# Patient Record
Sex: MALE | Race: WHITE | NOT HISPANIC OR LATINO
[De-identification: names, ages, dates, MRNs, and addresses within clinical notes are randomized per-mention and may not be internally consistent; named-entity substitution may affect disease eponyms.]

---

## 2019-01-14 ENCOUNTER — APPOINTMENT (OUTPATIENT)
Dept: ORTHOPEDIC SURGERY | Facility: CLINIC | Age: 79
End: 2019-01-14
Payer: MEDICARE

## 2019-01-14 VITALS — HEIGHT: 69 IN | RESPIRATION RATE: 16 BRPM | BODY MASS INDEX: 23.7 KG/M2 | WEIGHT: 160 LBS

## 2019-01-14 DIAGNOSIS — Z87.438 PERSONAL HISTORY OF OTHER DISEASES OF MALE GENITAL ORGANS: ICD-10-CM

## 2019-01-14 DIAGNOSIS — E78.00 PURE HYPERCHOLESTEROLEMIA, UNSPECIFIED: ICD-10-CM

## 2019-01-14 PROBLEM — Z00.00 ENCOUNTER FOR PREVENTIVE HEALTH EXAMINATION: Status: ACTIVE | Noted: 2019-01-14

## 2019-01-14 PROCEDURE — 73070 X-RAY EXAM OF ELBOW: CPT | Mod: 50

## 2019-01-14 PROCEDURE — 99203 OFFICE O/P NEW LOW 30 MIN: CPT | Mod: 25

## 2019-01-14 PROCEDURE — 20605 DRAIN/INJ JOINT/BURSA W/O US: CPT | Mod: LT

## 2019-01-14 RX ORDER — ATORVASTATIN CALCIUM 10 MG/1
10 TABLET, FILM COATED ORAL
Refills: 0 | Status: ACTIVE | COMMUNITY

## 2019-01-14 RX ORDER — FINASTERIDE 5 MG/1
5 TABLET, FILM COATED ORAL
Refills: 0 | Status: ACTIVE | COMMUNITY

## 2019-01-14 RX ORDER — TAMSULOSIN HYDROCHLORIDE 0.4 MG/1
0.4 CAPSULE ORAL
Refills: 0 | Status: ACTIVE | COMMUNITY

## 2019-01-14 NOTE — HISTORY OF PRESENT ILLNESS
[Right] : right hand dominant [FreeTextEntry1] : Pt here for B/L medial epicondylitis with a MRI on the right done on 12/21/18.  Pt tried therapy that made it worse.  \par \par He has tried rest and Nsaids and therapy.\par \par He denies any significant history of trauma changes in activity or changes in sensation or feel some nausea and mechanical symptoms\par

## 2019-01-14 NOTE — REVIEW OF SYSTEMS
[Right] : right [Arthralgia] : arthralgia [Joint Pain] : joint pain [Joint Stiffness] : joint stiffness [Joint Swelling] : joint swelling [Negative] : Allergic/Immunologic

## 2019-01-14 NOTE — PHYSICAL EXAM
[de-identified] : Physical exam shows the patient to be alert and oriented x3, capable of ambulation. The patient is well-developed and well-nourished in no apparent respiratory distress. Majority of the skin is intact bilaterally in the upper extremities without lymphadenopathy at the elbows.\par \par There is a negative Spurling test. There is no sensitivity or Tinel's over the axilla bilaterally in the area of the brachial plexus.\par \par The elbows have a symmetric and full range of motion bilaterally without evidence of pain on forced flexion or extension. No masses are palpable bilaterally. There is no tenderness or instability of the medial or lateral collateral ligaments bilaterally. There is no joint effusion. There is no tenderness over the biceps or triceps tendons bilaterally with 5 over 5 strength. There is no tenderness over the lateral epicondyles bilaterally. There is tenderness over the right greater than left medial epicondyle with pain increased with forced wrist flexion and resisted pronation which localizes to the medial epicondyle. This is negative on the opposite asymptomatic side. There is no tenderness over the radial tunnel. There is no sensitivity over the radial , median nerves and no changes in sensation with provocative movements over these nerves. There is no instability or sensitivity over the ulnar nerve through a full range of motion and was provocative tests bilaterally.\par \par There is good capillary refill of the digits bilaterally.There is no masses or sensitivity over the median and ulnar nerves at the level of the wrist. There is a negative Tinel's and negative Phalen's sign bilaterally. The sensation is grossly intact bilaterally.

## 2019-01-14 NOTE — CONSULT LETTER
[Dear  ___] : Dear  [unfilled], [Consult Letter:] : I had the pleasure of evaluating your patient, [unfilled]. [Please see my note below.] : Please see my note below. [Consult Closing:] : Thank you very much for allowing me to participate in the care of this patient.  If you have any questions, please do not hesitate to contact me. [Sincerely,] : Sincerely, [FreeTextEntry3] : Christian Guthrie M.D., FAAOS\par Co-Director\par [facility]\par

## 2019-01-14 NOTE — ASSESSMENT
[FreeTextEntry1] : Right greater than left elbow medial epicondylitis.\par The right biceps tendinitis seen on MRI is clinically asymptomatic.\par \par My impression is that this patient has right greater than left lateral epicondylitis.   The risks, benefits, differential diagnosis and alternatives were discussed with the patient in detail and they elected to undergo a steroid injection right medial elbow epicondyle combined with a home program and therapy bilaterally. The risks discussed included (but was not limited to) pain, infection, subcutaneous atrophy, skin depigmentation, etc... Under informed consent and sterile conditions, 1 cc of 2% plain lidocaine  and 1 cc of Kenalog 10 was precisely injected into the patient's lateral elbow at the ECRB origin at the area of maximal tenderness. A sterile Band-Aid was placed.  It is my hope that this significantly alleviates the patient's symptoms.\par \par  It is hoped that this results in a complete resolution of symptoms but his symptoms.\par \par Return to the office in 4 weeks\par

## 2019-02-11 ENCOUNTER — APPOINTMENT (OUTPATIENT)
Dept: ORTHOPEDIC SURGERY | Facility: CLINIC | Age: 79
End: 2019-02-11
Payer: MEDICARE

## 2019-02-11 VITALS — WEIGHT: 160 LBS | HEIGHT: 69 IN | BODY MASS INDEX: 23.7 KG/M2 | RESPIRATION RATE: 16 BRPM

## 2019-02-11 PROCEDURE — 99214 OFFICE O/P EST MOD 30 MIN: CPT | Mod: 25

## 2019-02-11 PROCEDURE — 20605 DRAIN/INJ JOINT/BURSA W/O US: CPT | Mod: LT

## 2019-03-27 ENCOUNTER — APPOINTMENT (OUTPATIENT)
Dept: ORTHOPEDIC SURGERY | Facility: CLINIC | Age: 79
End: 2019-03-27
Payer: MEDICARE

## 2019-03-27 VITALS — RESPIRATION RATE: 16 BRPM | HEIGHT: 69 IN | BODY MASS INDEX: 23.7 KG/M2 | WEIGHT: 160 LBS

## 2019-03-27 PROCEDURE — 99214 OFFICE O/P EST MOD 30 MIN: CPT

## 2019-05-08 ENCOUNTER — APPOINTMENT (OUTPATIENT)
Dept: ORTHOPEDIC SURGERY | Facility: CLINIC | Age: 79
End: 2019-05-08
Payer: MEDICARE

## 2019-05-08 VITALS — RESPIRATION RATE: 16 BRPM | WEIGHT: 160 LBS | BODY MASS INDEX: 23.7 KG/M2 | HEIGHT: 69 IN

## 2019-05-08 DIAGNOSIS — M77.11 LATERAL EPICONDYLITIS, RIGHT ELBOW: ICD-10-CM

## 2019-05-08 DIAGNOSIS — M77.02 MEDIAL EPICONDYLITIS, LEFT ELBOW: ICD-10-CM

## 2019-05-08 DIAGNOSIS — M77.01 MEDIAL EPICONDYLITIS, RIGHT ELBOW: ICD-10-CM

## 2019-05-08 DIAGNOSIS — M77.12 LATERAL EPICONDYLITIS, LEFT ELBOW: ICD-10-CM

## 2019-05-08 PROCEDURE — 99214 OFFICE O/P EST MOD 30 MIN: CPT

## 2019-06-19 ENCOUNTER — OTHER (OUTPATIENT)
Age: 79
End: 2019-06-19

## 2019-06-19 ENCOUNTER — MOBILE ON CALL (OUTPATIENT)
Age: 79
End: 2019-06-19

## 2019-06-20 ENCOUNTER — OTHER (OUTPATIENT)
Age: 79
End: 2019-06-20

## 2019-06-20 ENCOUNTER — MOBILE ON CALL (OUTPATIENT)
Age: 79
End: 2019-06-20

## 2019-06-27 ENCOUNTER — MOBILE ON CALL (OUTPATIENT)
Age: 79
End: 2019-06-27

## 2019-06-27 ENCOUNTER — OTHER (OUTPATIENT)
Age: 79
End: 2019-06-27

## 2022-10-08 VITALS
DIASTOLIC BLOOD PRESSURE: 56 MMHG | OXYGEN SATURATION: 99 % | RESPIRATION RATE: 18 BRPM | SYSTOLIC BLOOD PRESSURE: 97 MMHG | HEART RATE: 85 BPM

## 2022-10-08 PROCEDURE — 99291 CRITICAL CARE FIRST HOUR: CPT | Mod: CS

## 2022-10-08 PROCEDURE — 93010 ELECTROCARDIOGRAM REPORT: CPT

## 2022-10-08 NOTE — ED ADULT TRIAGE NOTE - ARRIVAL INFO ADDITIONAL COMMENTS
pt sent from [facility] with hypotension.   unknown hx of UTI.   upon ems arrival pt's SBP 70s.    given 1 liter of NS with no effect so norepinephrine drip started.

## 2022-10-09 ENCOUNTER — INPATIENT (INPATIENT)
Facility: HOSPITAL | Age: 82
LOS: 5 days | Discharge: HOME | DRG: 871 | End: 2022-10-15
Attending: STUDENT IN AN ORGANIZED HEALTH CARE EDUCATION/TRAINING PROGRAM | Admitting: INTERNAL MEDICINE
Payer: MEDICARE

## 2022-10-09 DIAGNOSIS — D63.8 ANEMIA IN OTHER CHRONIC DISEASES CLASSIFIED ELSEWHERE: ICD-10-CM

## 2022-10-09 DIAGNOSIS — Z93.1 GASTROSTOMY STATUS: ICD-10-CM

## 2022-10-09 DIAGNOSIS — N17.0 ACUTE KIDNEY FAILURE WITH TUBULAR NECROSIS: ICD-10-CM

## 2022-10-09 DIAGNOSIS — R65.21 SEVERE SEPSIS WITH SEPTIC SHOCK: ICD-10-CM

## 2022-10-09 DIAGNOSIS — Z23 ENCOUNTER FOR IMMUNIZATION: ICD-10-CM
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
## 2022-10-09 DIAGNOSIS — T83.511A INFECTION AND INFLAMMATORY REACTION DUE TO INDWELLING URETHRAL CATHETER, INITIAL ENCOUNTER: ICD-10-CM
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
## 2022-10-09 DIAGNOSIS — A41.9 SEPSIS, UNSPECIFIED ORGANISM: ICD-10-CM

## 2022-10-09 DIAGNOSIS — F41.9 ANXIETY DISORDER, UNSPECIFIED: ICD-10-CM

## 2022-10-09 DIAGNOSIS — N39.0 URINARY TRACT INFECTION, SITE NOT SPECIFIED: ICD-10-CM

## 2022-10-09 DIAGNOSIS — F32.A DEPRESSION, UNSPECIFIED: ICD-10-CM

## 2022-10-09 DIAGNOSIS — E83.42 HYPOMAGNESEMIA: ICD-10-CM
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
## 2022-10-09 DIAGNOSIS — R74.01 ELEVATION OF LEVELS OF LIVER TRANSAMINASE LEVELS: ICD-10-CM

## 2022-10-09 DIAGNOSIS — G93.41 METABOLIC ENCEPHALOPATHY: ICD-10-CM

## 2022-10-09 DIAGNOSIS — R33.8 OTHER RETENTION OF URINE: ICD-10-CM

## 2022-10-09 DIAGNOSIS — J69.0 PNEUMONITIS DUE TO INHALATION OF FOOD AND VOMIT: ICD-10-CM

## 2022-10-09 DIAGNOSIS — R53.2 FUNCTIONAL QUADRIPLEGIA: ICD-10-CM

## 2022-10-09 DIAGNOSIS — R13.19 OTHER DYSPHAGIA: ICD-10-CM

## 2022-10-09 DIAGNOSIS — Z66 DO NOT RESUSCITATE: ICD-10-CM

## 2022-10-09 DIAGNOSIS — N40.1 BENIGN PROSTATIC HYPERPLASIA WITH LOWER URINARY TRACT SYMPTOMS: ICD-10-CM

## 2022-10-09 DIAGNOSIS — K57.92 DIVERTICULITIS OF INTESTINE, PART UNSPECIFIED, WITHOUT PERFORATION OR ABSCESS WITHOUT BLEEDING: ICD-10-CM

## 2022-10-09 DIAGNOSIS — T83.83XA HEMORRHAGE DUE TO GENITOURINARY PROSTHETIC DEVICES, IMPLANTS AND GRAFTS, INITIAL ENCOUNTER: ICD-10-CM

## 2022-10-09 DIAGNOSIS — K82.8 OTHER SPECIFIED DISEASES OF GALLBLADDER: ICD-10-CM

## 2022-10-09 DIAGNOSIS — R31.0 GROSS HEMATURIA: ICD-10-CM

## 2022-10-09 DIAGNOSIS — I69.391 DYSPHAGIA FOLLOWING CEREBRAL INFARCTION: ICD-10-CM

## 2022-10-09 DIAGNOSIS — E78.5 HYPERLIPIDEMIA, UNSPECIFIED: ICD-10-CM

## 2022-10-09 DIAGNOSIS — Z20.822 CONTACT WITH AND (SUSPECTED) EXPOSURE TO COVID-19: ICD-10-CM
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
## 2022-10-09 LAB
ALBUMIN SERPL ELPH-MCNC: 2.9 G/DL — LOW (ref 3.3–5)
ALBUMIN SERPL ELPH-MCNC: 3.2 G/DL — LOW (ref 3.3–5)
ALP SERPL-CCNC: 116 U/L — SIGNIFICANT CHANGE UP (ref 40–120)
ALP SERPL-CCNC: 119 U/L — SIGNIFICANT CHANGE UP (ref 40–120)
ALT FLD-CCNC: 70 U/L — HIGH (ref 10–45)
ALT FLD-CCNC: 78 U/L — HIGH (ref 10–45)
ANION GAP SERPL CALC-SCNC: 13 MMOL/L — SIGNIFICANT CHANGE UP (ref 5–17)
ANION GAP SERPL CALC-SCNC: 15 MMOL/L — SIGNIFICANT CHANGE UP (ref 5–17)
ANISOCYTOSIS BLD QL: SLIGHT — SIGNIFICANT CHANGE UP
APPEARANCE UR: ABNORMAL
APTT BLD: 34.4 SEC — SIGNIFICANT CHANGE UP (ref 27.5–35.5)
AST SERPL-CCNC: 62 U/L — HIGH (ref 10–40)
AST SERPL-CCNC: 74 U/L — HIGH (ref 10–40)
BACTERIA # UR AUTO: PRESENT /HPF
BASE EXCESS BLDV CALC-SCNC: -5.1 MMOL/L — LOW (ref -2–3)
BASOPHILS # BLD AUTO: 0 K/UL — SIGNIFICANT CHANGE UP (ref 0–0.2)
BASOPHILS # BLD AUTO: 0 K/UL — SIGNIFICANT CHANGE UP (ref 0–0.2)
BASOPHILS NFR BLD AUTO: 0 % — SIGNIFICANT CHANGE UP (ref 0–2)
BASOPHILS NFR BLD AUTO: 0 % — SIGNIFICANT CHANGE UP (ref 0–2)
BILIRUB DIRECT SERPL-MCNC: <0.2 MG/DL — SIGNIFICANT CHANGE UP (ref 0–0.3)
BILIRUB INDIRECT FLD-MCNC: SIGNIFICANT CHANGE UP MG/DL (ref 0.2–1)
BILIRUB SERPL-MCNC: 0.3 MG/DL — SIGNIFICANT CHANGE UP (ref 0.2–1.2)
BILIRUB SERPL-MCNC: 0.4 MG/DL — SIGNIFICANT CHANGE UP (ref 0.2–1.2)
BILIRUB UR-MCNC: NEGATIVE — SIGNIFICANT CHANGE UP
BUN SERPL-MCNC: 21 MG/DL — SIGNIFICANT CHANGE UP (ref 7–23)
BUN SERPL-MCNC: 22 MG/DL — SIGNIFICANT CHANGE UP (ref 7–23)
BURR CELLS BLD QL SMEAR: PRESENT — SIGNIFICANT CHANGE UP
BURR CELLS BLD QL SMEAR: PRESENT — SIGNIFICANT CHANGE UP
CA-I SERPL-SCNC: 1.16 MMOL/L — SIGNIFICANT CHANGE UP (ref 1.15–1.33)
CALCIUM SERPL-MCNC: 8.3 MG/DL — LOW (ref 8.4–10.5)
CALCIUM SERPL-MCNC: 8.4 MG/DL — SIGNIFICANT CHANGE UP (ref 8.4–10.5)
CHLORIDE SERPL-SCNC: 104 MMOL/L — SIGNIFICANT CHANGE UP (ref 96–108)
CHLORIDE SERPL-SCNC: 108 MMOL/L — SIGNIFICANT CHANGE UP (ref 96–108)
CO2 BLDV-SCNC: 22.5 MMOL/L — SIGNIFICANT CHANGE UP (ref 22–26)
CO2 SERPL-SCNC: 18 MMOL/L — LOW (ref 22–31)
CO2 SERPL-SCNC: 22 MMOL/L — SIGNIFICANT CHANGE UP (ref 22–31)
COLOR SPEC: ABNORMAL
CREAT SERPL-MCNC: 1.03 MG/DL — SIGNIFICANT CHANGE UP (ref 0.5–1.3)
CREAT SERPL-MCNC: 1.19 MG/DL — SIGNIFICANT CHANGE UP (ref 0.5–1.3)
DIFF PNL FLD: ABNORMAL
EGFR: 62 ML/MIN/1.73M2 — SIGNIFICANT CHANGE UP
EGFR: 74 ML/MIN/1.73M2 — SIGNIFICANT CHANGE UP
EOSINOPHIL # BLD AUTO: 0 K/UL — SIGNIFICANT CHANGE UP (ref 0–0.5)
EOSINOPHIL # BLD AUTO: 0 K/UL — SIGNIFICANT CHANGE UP (ref 0–0.5)
EOSINOPHIL NFR BLD AUTO: 0 % — SIGNIFICANT CHANGE UP (ref 0–6)
EOSINOPHIL NFR BLD AUTO: 0 % — SIGNIFICANT CHANGE UP (ref 0–6)
EPI CELLS # UR: SIGNIFICANT CHANGE UP /HPF (ref 0–5)
GAS PNL BLDV: 136 MMOL/L — SIGNIFICANT CHANGE UP (ref 136–145)
GAS PNL BLDV: SIGNIFICANT CHANGE UP
GAS PNL BLDV: SIGNIFICANT CHANGE UP
GLUCOSE BLDC GLUCOMTR-MCNC: 103 MG/DL — HIGH (ref 70–99)
GLUCOSE BLDC GLUCOMTR-MCNC: 125 MG/DL — HIGH (ref 70–99)
GLUCOSE SERPL-MCNC: 141 MG/DL — HIGH (ref 70–99)
GLUCOSE SERPL-MCNC: 187 MG/DL — HIGH (ref 70–99)
GLUCOSE UR QL: NEGATIVE — SIGNIFICANT CHANGE UP
HCO3 BLDV-SCNC: 21 MMOL/L — LOW (ref 22–29)
HCT VFR BLD CALC: 29.1 % — LOW (ref 39–50)
HCT VFR BLD CALC: 30.3 % — LOW (ref 39–50)
HGB BLD-MCNC: 9.2 G/DL — LOW (ref 13–17)
HGB BLD-MCNC: 9.6 G/DL — LOW (ref 13–17)
INR BLD: 1.3 — HIGH (ref 0.88–1.16)
KETONES UR-MCNC: NEGATIVE — SIGNIFICANT CHANGE UP
LACTATE SERPL-SCNC: 4 MMOL/L — CRITICAL HIGH (ref 0.5–2)
LACTATE SERPL-SCNC: 4.1 MMOL/L — CRITICAL HIGH (ref 0.5–2)
LACTATE SERPL-SCNC: 4.3 MMOL/L — CRITICAL HIGH (ref 0.5–2)
LEUKOCYTE ESTERASE UR-ACNC: ABNORMAL
LYMPHOCYTES # BLD AUTO: 0.47 K/UL — LOW (ref 1–3.3)
LYMPHOCYTES # BLD AUTO: 0.67 K/UL — LOW (ref 1–3.3)
LYMPHOCYTES # BLD AUTO: 0.9 % — LOW (ref 13–44)
LYMPHOCYTES # BLD AUTO: 1.8 % — LOW (ref 13–44)
MAGNESIUM SERPL-MCNC: 1.4 MG/DL — LOW (ref 1.6–2.6)
MAGNESIUM SERPL-MCNC: 1.8 MG/DL — SIGNIFICANT CHANGE UP (ref 1.6–2.6)
MANUAL SMEAR VERIFICATION: SIGNIFICANT CHANGE UP
MANUAL SMEAR VERIFICATION: SIGNIFICANT CHANGE UP
MCHC RBC-ENTMCNC: 30.5 PG — SIGNIFICANT CHANGE UP (ref 27–34)
MCHC RBC-ENTMCNC: 30.7 PG — SIGNIFICANT CHANGE UP (ref 27–34)
MCHC RBC-ENTMCNC: 31.6 GM/DL — LOW (ref 32–36)
MCHC RBC-ENTMCNC: 31.7 GM/DL — LOW (ref 32–36)
MCV RBC AUTO: 96.4 FL — SIGNIFICANT CHANGE UP (ref 80–100)
MCV RBC AUTO: 96.8 FL — SIGNIFICANT CHANGE UP (ref 80–100)
METAMYELOCYTES # FLD: 2.6 % — HIGH (ref 0–0)
MONOCYTES # BLD AUTO: 0.63 K/UL — SIGNIFICANT CHANGE UP (ref 0–0.9)
MONOCYTES # BLD AUTO: 0.89 K/UL — SIGNIFICANT CHANGE UP (ref 0–0.9)
MONOCYTES NFR BLD AUTO: 1.7 % — LOW (ref 2–14)
MONOCYTES NFR BLD AUTO: 1.7 % — LOW (ref 2–14)
NEUTROPHILS # BLD AUTO: 35.89 K/UL — HIGH (ref 1.8–7.4)
NEUTROPHILS # BLD AUTO: 49.46 K/UL — HIGH (ref 1.8–7.4)
NEUTROPHILS NFR BLD AUTO: 90.4 % — HIGH (ref 43–77)
NEUTROPHILS NFR BLD AUTO: 91.3 % — HIGH (ref 43–77)
NEUTS BAND # BLD: 4.4 % — SIGNIFICANT CHANGE UP (ref 0–8)
NEUTS BAND # BLD: 5.2 % — SIGNIFICANT CHANGE UP (ref 0–8)
NITRITE UR-MCNC: NEGATIVE — SIGNIFICANT CHANGE UP
OVALOCYTES BLD QL SMEAR: SLIGHT — SIGNIFICANT CHANGE UP
OVALOCYTES BLD QL SMEAR: SLIGHT — SIGNIFICANT CHANGE UP
PCO2 BLDV: 43 MMHG — SIGNIFICANT CHANGE UP (ref 42–55)
PH BLDV: 7.3 — LOW (ref 7.32–7.43)
PH UR: 6 — SIGNIFICANT CHANGE UP (ref 5–8)
PHOSPHATE SERPL-MCNC: 4.1 MG/DL — SIGNIFICANT CHANGE UP (ref 2.5–4.5)
PLAT MORPH BLD: NORMAL — SIGNIFICANT CHANGE UP
PLAT MORPH BLD: NORMAL — SIGNIFICANT CHANGE UP
PLATELET # BLD AUTO: 213 K/UL — SIGNIFICANT CHANGE UP (ref 150–400)
PLATELET # BLD AUTO: 219 K/UL — SIGNIFICANT CHANGE UP (ref 150–400)
PO2 BLDV: 52 MMHG — HIGH (ref 25–45)
POIKILOCYTOSIS BLD QL AUTO: SLIGHT — SIGNIFICANT CHANGE UP
POLYCHROMASIA BLD QL SMEAR: SLIGHT — SIGNIFICANT CHANGE UP
POTASSIUM BLDV-SCNC: 3.9 MMOL/L — SIGNIFICANT CHANGE UP (ref 3.5–5.1)
POTASSIUM SERPL-MCNC: 3.9 MMOL/L — SIGNIFICANT CHANGE UP (ref 3.5–5.3)
POTASSIUM SERPL-MCNC: 4.4 MMOL/L — SIGNIFICANT CHANGE UP (ref 3.5–5.3)
POTASSIUM SERPL-SCNC: 3.9 MMOL/L — SIGNIFICANT CHANGE UP (ref 3.5–5.3)
POTASSIUM SERPL-SCNC: 4.4 MMOL/L — SIGNIFICANT CHANGE UP (ref 3.5–5.3)
PROT SERPL-MCNC: 5.6 G/DL — LOW (ref 6–8.3)
PROT SERPL-MCNC: 5.9 G/DL — LOW (ref 6–8.3)
PROT UR-MCNC: 100 MG/DL
PROTHROM AB SERPL-ACNC: 15.5 SEC — HIGH (ref 10.5–13.4)
RBC # BLD: 3.02 M/UL — LOW (ref 4.2–5.8)
RBC # BLD: 3.13 M/UL — LOW (ref 4.2–5.8)
RBC # FLD: 15.6 % — HIGH (ref 10.3–14.5)
RBC # FLD: 15.7 % — HIGH (ref 10.3–14.5)
RBC BLD AUTO: ABNORMAL
RBC BLD AUTO: ABNORMAL
RBC CASTS # UR COMP ASSIST: > 10 /HPF
SAO2 % BLDV: 81.3 % — SIGNIFICANT CHANGE UP (ref 67–88)
SARS-COV-2 RNA SPEC QL NAA+PROBE: NEGATIVE — SIGNIFICANT CHANGE UP
SCHISTOCYTES BLD QL AUTO: SLIGHT — SIGNIFICANT CHANGE UP
SODIUM SERPL-SCNC: 139 MMOL/L — SIGNIFICANT CHANGE UP (ref 135–145)
SODIUM SERPL-SCNC: 141 MMOL/L — SIGNIFICANT CHANGE UP (ref 135–145)
SP GR SPEC: 1.02 — SIGNIFICANT CHANGE UP (ref 1–1.03)
TROPONIN T SERPL-MCNC: 0.02 NG/ML — HIGH (ref 0–0.01)
TROPONIN T SERPL-MCNC: 0.04 NG/ML — CRITICAL HIGH (ref 0–0.01)
UROBILINOGEN FLD QL: 0.2 E.U./DL — SIGNIFICANT CHANGE UP
WBC # BLD: 37.19 K/UL — HIGH (ref 3.8–10.5)
WBC # BLD: 52.17 K/UL — CRITICAL HIGH (ref 3.8–10.5)
WBC # FLD AUTO: 37.19 K/UL — HIGH (ref 3.8–10.5)
WBC # FLD AUTO: 52.17 K/UL — CRITICAL HIGH (ref 3.8–10.5)
WBC UR QL: > 10 /HPF

## 2022-10-09 PROCEDURE — 71260 CT THORAX DX C+: CPT | Mod: 26,MA

## 2022-10-09 PROCEDURE — 99291 CRITICAL CARE FIRST HOUR: CPT

## 2022-10-09 PROCEDURE — 76604 US EXAM CHEST: CPT | Mod: 26

## 2022-10-09 PROCEDURE — 99292 CRITICAL CARE ADDL 30 MIN: CPT | Mod: 25

## 2022-10-09 PROCEDURE — 74177 CT ABD & PELVIS W/CONTRAST: CPT | Mod: 26,MA

## 2022-10-09 PROCEDURE — 93308 TTE F-UP OR LMTD: CPT | Mod: 26

## 2022-10-09 PROCEDURE — 76705 ECHO EXAM OF ABDOMEN: CPT | Mod: 26

## 2022-10-09 PROCEDURE — 71045 X-RAY EXAM CHEST 1 VIEW: CPT | Mod: 26

## 2022-10-09 PROCEDURE — 70450 CT HEAD/BRAIN W/O DYE: CPT | Mod: 26,MA

## 2022-10-09 RX ORDER — PIPERACILLIN AND TAZOBACTAM 4; .5 G/20ML; G/20ML
3.38 INJECTION, POWDER, LYOPHILIZED, FOR SOLUTION INTRAVENOUS ONCE
Refills: 0 | Status: COMPLETED | OUTPATIENT
Start: 2022-10-09 | End: 2022-10-09

## 2022-10-09 RX ORDER — NOREPINEPHRINE BITARTRATE/D5W 8 MG/250ML
0.05 PLASTIC BAG, INJECTION (ML) INTRAVENOUS
Qty: 8 | Refills: 0 | Status: DISCONTINUED | OUTPATIENT
Start: 2022-10-09 | End: 2022-10-10

## 2022-10-09 RX ORDER — PANTOPRAZOLE SODIUM 20 MG/1
40 TABLET, DELAYED RELEASE ORAL EVERY 24 HOURS
Refills: 0 | Status: DISCONTINUED | OUTPATIENT
Start: 2022-10-09 | End: 2022-10-09

## 2022-10-09 RX ORDER — ENOXAPARIN SODIUM 100 MG/ML
40 INJECTION SUBCUTANEOUS EVERY 24 HOURS
Refills: 0 | Status: DISCONTINUED | OUTPATIENT
Start: 2022-10-09 | End: 2022-10-15

## 2022-10-09 RX ORDER — SODIUM CHLORIDE 9 MG/ML
500 INJECTION, SOLUTION INTRAVENOUS ONCE
Refills: 0 | Status: COMPLETED | OUTPATIENT
Start: 2022-10-09 | End: 2022-10-09

## 2022-10-09 RX ORDER — SERTRALINE 25 MG/1
50 TABLET, FILM COATED ORAL EVERY 24 HOURS
Refills: 0 | Status: DISCONTINUED | OUTPATIENT
Start: 2022-10-09 | End: 2022-10-11

## 2022-10-09 RX ORDER — ATORVASTATIN CALCIUM 80 MG/1
80 TABLET, FILM COATED ORAL EVERY 24 HOURS
Refills: 0 | Status: DISCONTINUED | OUTPATIENT
Start: 2022-10-09 | End: 2022-10-11

## 2022-10-09 RX ORDER — ACETAMINOPHEN 500 MG
1000 TABLET ORAL ONCE
Refills: 0 | Status: COMPLETED | OUTPATIENT
Start: 2022-10-09 | End: 2022-10-09

## 2022-10-09 RX ORDER — ASPIRIN/CALCIUM CARB/MAGNESIUM 324 MG
81 TABLET ORAL DAILY
Refills: 0 | Status: DISCONTINUED | OUTPATIENT
Start: 2022-10-09 | End: 2022-10-11

## 2022-10-09 RX ORDER — SODIUM CHLORIDE 9 MG/ML
500 INJECTION INTRAMUSCULAR; INTRAVENOUS; SUBCUTANEOUS ONCE
Refills: 0 | Status: COMPLETED | OUTPATIENT
Start: 2022-10-09 | End: 2022-10-09

## 2022-10-09 RX ORDER — PIPERACILLIN AND TAZOBACTAM 4; .5 G/20ML; G/20ML
3.38 INJECTION, POWDER, LYOPHILIZED, FOR SOLUTION INTRAVENOUS EVERY 8 HOURS
Refills: 0 | Status: DISCONTINUED | OUTPATIENT
Start: 2022-10-09 | End: 2022-10-15

## 2022-10-09 RX ORDER — VANCOMYCIN HCL 1 G
1000 VIAL (EA) INTRAVENOUS ONCE
Refills: 0 | Status: COMPLETED | OUTPATIENT
Start: 2022-10-09 | End: 2022-10-09

## 2022-10-09 RX ORDER — CHLORHEXIDINE GLUCONATE 213 G/1000ML
1 SOLUTION TOPICAL
Refills: 0 | Status: DISCONTINUED | OUTPATIENT
Start: 2022-10-09 | End: 2022-10-14

## 2022-10-09 RX ORDER — VANCOMYCIN HCL 1 G
1000 VIAL (EA) INTRAVENOUS EVERY 12 HOURS
Refills: 0 | Status: DISCONTINUED | OUTPATIENT
Start: 2022-10-09 | End: 2022-10-10

## 2022-10-09 RX ORDER — SODIUM CHLORIDE 9 MG/ML
1000 INJECTION INTRAMUSCULAR; INTRAVENOUS; SUBCUTANEOUS ONCE
Refills: 0 | Status: COMPLETED | OUTPATIENT
Start: 2022-10-09 | End: 2022-10-09

## 2022-10-09 RX ORDER — PANTOPRAZOLE SODIUM 20 MG/1
40 TABLET, DELAYED RELEASE ORAL EVERY 12 HOURS
Refills: 0 | Status: DISCONTINUED | OUTPATIENT
Start: 2022-10-09 | End: 2022-10-09

## 2022-10-09 RX ORDER — PANTOPRAZOLE SODIUM 20 MG/1
40 TABLET, DELAYED RELEASE ORAL EVERY 12 HOURS
Refills: 0 | Status: DISCONTINUED | OUTPATIENT
Start: 2022-10-09 | End: 2022-10-10

## 2022-10-09 RX ORDER — MAGNESIUM SULFATE 500 MG/ML
1 VIAL (ML) INJECTION ONCE
Refills: 0 | Status: COMPLETED | OUTPATIENT
Start: 2022-10-09 | End: 2022-10-09

## 2022-10-09 RX ADMIN — SODIUM CHLORIDE 1000 MILLILITER(S): 9 INJECTION INTRAMUSCULAR; INTRAVENOUS; SUBCUTANEOUS at 00:55

## 2022-10-09 RX ADMIN — Medication 6.97 MICROGRAM(S)/KG/MIN: at 01:32

## 2022-10-09 RX ADMIN — SODIUM CHLORIDE 500 MILLILITER(S): 9 INJECTION INTRAMUSCULAR; INTRAVENOUS; SUBCUTANEOUS at 02:52

## 2022-10-09 RX ADMIN — Medication 1000 MILLIGRAM(S): at 01:05

## 2022-10-09 RX ADMIN — PIPERACILLIN AND TAZOBACTAM 200 GRAM(S): 4; .5 INJECTION, POWDER, LYOPHILIZED, FOR SOLUTION INTRAVENOUS at 01:04

## 2022-10-09 RX ADMIN — Medication 6.97 MICROGRAM(S)/KG/MIN: at 12:53

## 2022-10-09 RX ADMIN — Medication 100 GRAM(S): at 03:00

## 2022-10-09 RX ADMIN — SODIUM CHLORIDE 1000 MILLILITER(S): 9 INJECTION INTRAMUSCULAR; INTRAVENOUS; SUBCUTANEOUS at 00:10

## 2022-10-09 RX ADMIN — SERTRALINE 50 MILLIGRAM(S): 25 TABLET, FILM COATED ORAL at 11:40

## 2022-10-09 RX ADMIN — SODIUM CHLORIDE 250 MILLILITER(S): 9 INJECTION, SOLUTION INTRAVENOUS at 06:14

## 2022-10-09 RX ADMIN — SODIUM CHLORIDE 500 MILLILITER(S): 9 INJECTION INTRAMUSCULAR; INTRAVENOUS; SUBCUTANEOUS at 01:04

## 2022-10-09 RX ADMIN — Medication 1000 MILLIGRAM(S): at 03:43

## 2022-10-09 RX ADMIN — Medication 400 MILLIGRAM(S): at 00:53

## 2022-10-09 RX ADMIN — Medication 250 MILLIGRAM(S): at 12:53

## 2022-10-09 RX ADMIN — PIPERACILLIN AND TAZOBACTAM 25 GRAM(S): 4; .5 INJECTION, POWDER, LYOPHILIZED, FOR SOLUTION INTRAVENOUS at 10:00

## 2022-10-09 RX ADMIN — SODIUM CHLORIDE 500 MILLILITER(S): 9 INJECTION INTRAMUSCULAR; INTRAVENOUS; SUBCUTANEOUS at 01:56

## 2022-10-09 RX ADMIN — Medication 81 MILLIGRAM(S): at 11:40

## 2022-10-09 RX ADMIN — ATORVASTATIN CALCIUM 80 MILLIGRAM(S): 80 TABLET, FILM COATED ORAL at 18:44

## 2022-10-09 RX ADMIN — PIPERACILLIN AND TAZOBACTAM 3.38 GRAM(S): 4; .5 INJECTION, POWDER, LYOPHILIZED, FOR SOLUTION INTRAVENOUS at 01:56

## 2022-10-09 RX ADMIN — Medication 250 MILLIGRAM(S): at 01:55

## 2022-10-09 RX ADMIN — PANTOPRAZOLE SODIUM 40 MILLIGRAM(S): 20 TABLET, DELAYED RELEASE ORAL at 11:40

## 2022-10-09 RX ADMIN — CHLORHEXIDINE GLUCONATE 1 APPLICATION(S): 213 SOLUTION TOPICAL at 06:14

## 2022-10-09 RX ADMIN — PANTOPRAZOLE SODIUM 40 MILLIGRAM(S): 20 TABLET, DELAYED RELEASE ORAL at 22:59

## 2022-10-09 RX ADMIN — Medication 1000 MILLIGRAM(S): at 03:00

## 2022-10-09 RX ADMIN — PIPERACILLIN AND TAZOBACTAM 25 GRAM(S): 4; .5 INJECTION, POWDER, LYOPHILIZED, FOR SOLUTION INTRAVENOUS at 17:25

## 2022-10-09 RX ADMIN — ENOXAPARIN SODIUM 40 MILLIGRAM(S): 100 INJECTION SUBCUTANEOUS at 09:59

## 2022-10-09 NOTE — PROGRESS NOTE ADULT - ASSESSMENT
Patient is a 82 y/o M HLD, BPH diverticulitis, anxiety, depression with recent admission to [facility] for stroke now presenting with AMS and hypotension found to be septic 2/2 UTI requiring levophed.       *Not final until attending attestation*    Neuro: Per son, baseline 2 days pt was able to converse. prior to stroke was A&Ox3 caring for wife but since stroke has required assistance  #Metabolic encephalopathy:  2/2 UTI. CTH negative for intracranial hemorrhage or new acute stroke. Upon arrival - will nod head and respond to sternal rub however A&Ox0.   - see ID management below    #CVA: recent discharge for CVA.   - c/w ASA  - c/w atorvastatin    #Anxiety  - c/w sertraline via PEG  - hold baclofen    Pulmonary: no acute issues. Currently protecting his airway however high risk for intubation given hx of 4 prior intubations since August 2022    CVS  #Hypotension: presenting hypotensive with SBPs 80s non responsive to 2L NS, started on low dose levo to maintain MAP > 65. Lactate 4.1-->4.3  - c/w levo, wean as tolerated  - monitor UO     GI: PEG in place 2/2 dysphagia from CVA. Placed during hospitalization last month.   - keep NPO for now, restart tube feeds when airway protection more reasonably ensured  #Transaminitis  AST/ALT in 60s on discharge to [facility], now slightly worse, likely 2/2 shock  - repeat CMP    Endo: no acute issues    Renal  #BULL: Baseline Cr 09/2022 0.80, now Cr 1.19 likely 2/2 sepsis with possible ATN from hypotension. S/p 2L NS with minimal urine output  - will POCUS for cardiac function to assess ability to give more fluid  - urine lytes  - monitor UO, continue to trend      #BPH: chronic magdaleno for urinary retention, exchanged in ED.  - c/w magdaleno for UOP monitoring  - hold tamsulosin and finasteride, continue when off pressors and tolerating PO      ID:  #septic shock 2/2 UTI. Leukocytosis 37 with neutrophil predominance, lactate 4.1-->4.3 after 2L NS. S/p zosyn & vanc in ED. Given recent hospitalized would continue to cover with broad spectrum abx. Minimal UO.   - c/w vanc/zosyn  - f/u urine culture  - f/u blood culture  - monitor UO        F: s/p 2L NS  E: replete PRN  N: NPO, restart tube feeds as tolerated  DVTppx: lovenox  Lines: 3 peripheral IVs   Code status: DNR with cardioversion if necessary, trial intubation  Dispo: MICU  Patient is a 80 y/o M HLD, BPH diverticulitis, anxiety, depression with recent admission to [facility] for stroke now presenting with AMS and hypotension found to be septic 2/2 aspiration pna vs uti requiring levophed.     Neuro  Per son, baseline 2 days pt was able to converse. prior to stroke was A&Ox3 caring for wife but since stroke has required assistance    #Metabolic encephalopathy:  2/2 UTI. CTH negative for intracranial hemorrhage or new acute stroke. Upon arrival - will nod head and respond to sternal rub however A&Ox0.   - see ID management below  -Not sedated     #CVA: recent discharge for CVA.   - c/w ASA  - c/w atorvastatin    #Anxiety  - c/w sertraline via PEG  - hold baclofen    Pulmonary:  Currently protecting his airway however high risk for intubation given hx of 4 prior intubations since August 2022    #Aspiration PNA   Bedside US revealed RML and LLL consolidation and with hx of 4 prior intubations since August 2022. Was improving on last admission and able to tolerate PO diet, however with new AMS, has not been able to intake PO  -C/w vanc/zosyn   -Fu sputum, blood, urine bx   -F/u formal s/s evaluation   -Elevate head of bed       CVS  #Hypotension: presenting hypotensive with SBPs 80s non responsive to 2L NS, started on low dose levo to maintain MAP > 65. Lactate 4.1-->4.3  - c/w levo, wean as tolerated  - monitor UO     GI: PEG in place 2/2 dysphagia from CVA. Placed during hospitalization last month.   - keep NPO for now, restart tube feeds when airway protection more reasonably ensured    #Transaminitis  AST/ALT in 60s on discharge to [facility], now slightly worse, likely 2/2 shock. improving   - serial CMPs    #Enlarged gallbladder  Seen on CT, bedside US revealed no stones, with some sludge visualized   -F/u formal RUQ US     Endo: no acute issues    Renal  #BULL: Baseline Cr 09/2022 0.80, now Cr 1.19 likely 2/2 sepsis with possible ATN from hypotension. S/p 2L NS with minimal urine output  - will POCUS for cardiac function to assess ability to give more fluid  - urine lytes  - monitor UO, continue to trend      #BPH: chronic magdaleno for urinary retention, exchanged in ED.  - c/w magdaleno for UOP monitoring  - hold tamsulosin and finasteride, continue when off pressors and tolerating PO      ID:  #septic shock 2/2 aspiration PNA. Leukocytosis 37 with neutrophil predominance, lactate 4.1-->4.3 after 2L NS. S/p zosyn & vanc in ED. Given recent hospitalized would continue to cover with broad spectrum abx. Minimal UO. Recently MRSA+ on previous hospitalization. UA mildly positive   - c/w vanc/zosyn  - f/u urine culture  - f/u blood culture  - monitor UO    F: s/p 2L NS  E: replete PRN  N: NPO, restart tube feeds as tolerated  DVTppx: lovenox  Lines: 3 peripheral IVs   Code status: DNR with cardioversion if necessary, trial intubation  Dispo: MICU

## 2022-10-09 NOTE — H&P ADULT - CONVERSATION DETAILS
Discussed prognosis and treatment plan of septic shock. We discussed how we do not know what organism he is growing, but he likely has a UTI with possible bacteremia. He had 3 failed extubations during his [facility] admission, and the son Lobo was discussing possible trach with that team if he needed another intubation. We discussed how another intubation may necessitate a trach given his recent history and general illness. Lobo and his mother Laura are leaning away from that as the goal is for him to go back home rather than LTACH, but at this time they still do wish for intubation to correct for any short term or reversible desaturations Discussed prognosis and treatment plan of septic shock. We discussed how we do not know what organism he is growing, but he likely has a UTI with possible bacteremia. He had 3 failed extubations during his [facility] admission, and the son Lobo was discussing possible trach with that team if he needed another intubation. We discussed how another intubation may necessitate a trach given his recent history and general illness. Lobo and his mother Laura are leaning away from that as the goal is for him to go back home rather than LTACH, but at this time they still do wish for intubation to correct for any short term or reversible desaturations.  Would not want chest compressions due to concerns of pain and rib fractures, but if he had a clearly reversible arrhythmia they would want defibrillation or cardioversion.

## 2022-10-09 NOTE — PROVIDER CONTACT NOTE (OTHER) - ACTION/TREATMENT ORDERED:
MD Cardoza at bedside, IV removed and heat packs placed to site with improvement in coloring and temp.

## 2022-10-09 NOTE — PROGRESS NOTE ADULT - ATTENDING COMMENTS
Acute encephalopathy with UTI (perinephric strand) with septic shock (on levophed) with h/o chronic magdaleno. Plan for vancomycin and zosyn. Culture sent (pending). Rest as above.

## 2022-10-09 NOTE — ED PROVIDER NOTE - PROGRESS NOTE DETAILS
[place]: WBC 37, hgb 9.2, INR 1.3, albumin 5.2, AST 74, ALT 78, lactate 4.1, Mg 1.4, trop 0.04, pH 7.3, other labs grossly wnl. COVID neg. Pt BP dropping (received 1.5L in ED, 1.2 L prior to arrival). Levophed started w/ improvment (on minimal dose). UA/CTs pending. Will consult ICU, reassess. [place]: CT results pending, rpt lactate still elevated, accepted to MICU.

## 2022-10-09 NOTE — ED ADULT NURSE NOTE - OBJECTIVE STATEMENT
sent from NH for hypotension; per son pt has been less responsive than baseline;     present on arrival: saline lock 24g RFA; magdaleno cath 16 fr; PEG tube

## 2022-10-09 NOTE — H&P ADULT - ASSESSMENT
Patient is a 82 y/o M HLD, BPH diverticulitis, anxiety, depression with recent admission to [facility] for stroke now presenting with AMS and hypotension found to be septic 2/2 UTI requiring levophed.         *Not final until attending attestation*    Neuro: Per son, baseline 2 days pt was able to converse. prior to stroke was A&Ox3 caring for wife but since stroke has required assistance  #Metabolic encephalopathy: Likely 2/2 UTI. CTH negative for intracranial hemorrhage or new acute stroke  - see management below    Pulmonary: no acute issues    CVS:    GI: no acute issues  - NPO while altered    Endo: no acute issues    Renal: Cr 1.19    ID:  #sepsis: Likely 2/2 UTI. Leukocytosis 37 with neutrophil predominance. S/p zosyn & vanc in ED.           F:  E: replete PRN  N:  GI ppx:  DVTppx:  Lines:  Code status:  Dispo:  Patient is a 80 y/o M HLD, BPH diverticulitis, anxiety, depression with recent admission to [facility] for stroke now presenting with AMS and hypotension found to be septic 2/2 UTI requiring levophed.       *Not final until attending attestation*    Neuro: Per son, baseline 2 days pt was able to converse. prior to stroke was A&Ox3 caring for wife but since stroke has required assistance  #Metabolic encephalopathy: Likely 2/2 UTI. CTH negative for intracranial hemorrhage or new acute stroke. Upon arrival - will nod head and respond to sternal rub however A&Ox0.   - see ID management below    #CVA    Pulmonary: no acute issues. currently protecting his airway however high risk for intubation given hx of 3 prior intubations     CVS  #Hypotension: presenting hypotensive with SBPs 80s non responsive to 2L NS, started on low dose levo to maintain MAP > 65. Lactate 4.1-->4.3  - c/w levo, wean as tolerated  - monitor UO     GI: no acute issues  - NPO while altered    Endo: no acute issues    Renal: Cr 1.19    ID:  #sepsis: Likely 2/2 UTI. Leukocytosis 37 with neutrophil predominance, lactate 4.1-->4.3 after 2L NS. S/p zosyn & vanc in ED. Given recent hospitalized would continue to cover with broad spectrum abx. Minimal UO.   - c/w vanc/zosyn  - f/u urine culture  - f/u blood culture  - monitor UO        F: s/p 2L NS  E: replete PRN  N: NPO  GI ppx:  DVTppx:  Lines: 3 peripheral IVs   Code status: DNR with cardioversion if necessary, trial intubation  Dispo: MICU  Patient is a 82 y/o M HLD, BPH diverticulitis, anxiety, depression with recent admission to [facility] for stroke now presenting with AMS and hypotension found to be septic 2/2 UTI requiring levophed.       *Not final until attending attestation*    Neuro: Per son, baseline 2 days pt was able to converse. prior to stroke was A&Ox3 caring for wife but since stroke has required assistance  #Metabolic encephalopathy: Likely 2/2 UTI. CTH negative for intracranial hemorrhage or new acute stroke. Upon arrival - will nod head and respond to sternal rub however A&Ox0.   - see ID management below    #CVA    Pulmonary: no acute issues. currently protecting his airway however high risk for intubation given hx of 3 prior intubations     CVS  #Hypotension: presenting hypotensive with SBPs 80s non responsive to 2L NS, started on low dose levo to maintain MAP > 65. Lactate 4.1-->4.3  - c/w levo, wean as tolerated  - monitor UO     GI: no acute issues  - NPO while altered    Endo: no acute issues    Renal: Cr 1.19    ID:  #septic shock 2/2 UTI. Leukocytosis 37 with neutrophil predominance, lactate 4.1-->4.3 after 2L NS. S/p zosyn & vanc in ED. Given recent hospitalized would continue to cover with broad spectrum abx. Minimal UO.   - c/w vanc/zosyn  - f/u urine culture  - f/u blood culture  - monitor UO        F: s/p 2L NS  E: replete PRN  N: NPO  GI ppx:  DVTppx:  Lines: 3 peripheral IVs   Code status: DNR with cardioversion if necessary, trial intubation  Dispo: MICU  Patient is a 80 y/o M HLD, BPH diverticulitis, anxiety, depression with recent admission to [facility] for stroke now presenting with AMS and hypotension found to be septic 2/2 UTI requiring levophed.       *Not final until attending attestation*    Neuro: Per son, baseline 2 days pt was able to converse. prior to stroke was A&Ox3 caring for wife but since stroke has required assistance  #Metabolic encephalopathy:  2/2 UTI. CTH negative for intracranial hemorrhage or new acute stroke. Upon arrival - will nod head and respond to sternal rub however A&Ox0.   - see ID management below    #CVA: recent discharge for CVA.   - c/w ASA  - c/w atorvastatin    #Anxiety  - c/w sertraline via PEG  - hold baclofen    Pulmonary: no acute issues. Currently protecting his airway however high risk for intubation given hx of 3 prior intubations     CVS  #Hypotension: presenting hypotensive with SBPs 80s non responsive to 2L NS, started on low dose levo to maintain MAP > 65. Lactate 4.1-->4.3  - c/w levo, wean as tolerated  - monitor UO     GI: PEG in place 2/2 dysphagia from CVA. Placed during hospitalization last month.   - keep NPO for now, restart tube feeds when airway protection more reasonably ensured    Endo: no acute issues    Renal  #BULL: Baseline Cr 09/2022 0.80, now Cr 1.19 likely 2/2 sepsis with possible ATN from hypotension. S/p 2L NS with minimal urine output  - will POCUS for cardiac function to assess ability to give more fluid  - urine lytes  - monitor UO, continue to trend      #BPH: chronic magdaleno for urinary retention, exchanged in ED.  - c/w magdaleno for UOP monitoring  - hold tamsulosin and finasteride, continue when off pressors and tolerating PO      ID:  #septic shock 2/2 UTI. Leukocytosis 37 with neutrophil predominance, lactate 4.1-->4.3 after 2L NS. S/p zosyn & vanc in ED. Given recent hospitalized would continue to cover with broad spectrum abx. Minimal UO.   - c/w vanc/zosyn  - f/u urine culture  - f/u blood culture  - monitor UO        F: s/p 2L NS  E: replete PRN  N: NPO, restart tube feeds as tolerated  DVTppx: lovenox  Lines: 3 peripheral IVs   Code status: DNR with cardioversion if necessary, trial intubation  Dispo: MICU  Patient is a 82 y/o M HLD, BPH diverticulitis, anxiety, depression with recent admission to [facility] for stroke now presenting with AMS and hypotension found to be septic 2/2 UTI requiring levophed.       *Not final until attending attestation*    Neuro: Per son, baseline 2 days pt was able to converse. prior to stroke was A&Ox3 caring for wife but since stroke has required assistance  #Metabolic encephalopathy:  2/2 UTI. CTH negative for intracranial hemorrhage or new acute stroke. Upon arrival - will nod head and respond to sternal rub however A&Ox0.   - see ID management below    #CVA: recent discharge for CVA.   - c/w ASA  - c/w atorvastatin    #Anxiety  - c/w sertraline via PEG  - hold baclofen    Pulmonary: no acute issues. Currently protecting his airway however high risk for intubation given hx of 4 prior intubations since August 2022    CVS  #Hypotension: presenting hypotensive with SBPs 80s non responsive to 2L NS, started on low dose levo to maintain MAP > 65. Lactate 4.1-->4.3  - c/w levo, wean as tolerated  - monitor UO     GI: PEG in place 2/2 dysphagia from CVA. Placed during hospitalization last month.   - keep NPO for now, restart tube feeds when airway protection more reasonably ensured  #Transaminitis  AST/ALT in 60s on discharge to [facility], now slightly worse, likely 2/2 shock  - repeat CMP    Endo: no acute issues    Renal  #BULL: Baseline Cr 09/2022 0.80, now Cr 1.19 likely 2/2 sepsis with possible ATN from hypotension. S/p 2L NS with minimal urine output  - will POCUS for cardiac function to assess ability to give more fluid  - urine lytes  - monitor UO, continue to trend      #BPH: chronic magdaleno for urinary retention, exchanged in ED.  - c/w magdaleno for UOP monitoring  - hold tamsulosin and finasteride, continue when off pressors and tolerating PO      ID:  #septic shock 2/2 UTI. Leukocytosis 37 with neutrophil predominance, lactate 4.1-->4.3 after 2L NS. S/p zosyn & vanc in ED. Given recent hospitalized would continue to cover with broad spectrum abx. Minimal UO.   - c/w vanc/zosyn  - f/u urine culture  - f/u blood culture  - monitor UO        F: s/p 2L NS  E: replete PRN  N: NPO, restart tube feeds as tolerated  DVTppx: lovenox  Lines: 3 peripheral IVs   Code status: DNR with cardioversion if necessary, trial intubation  Dispo: MICU

## 2022-10-09 NOTE — ED PROVIDER NOTE - CARE PLAN
Principal Discharge DX:	Septic shock  Secondary Diagnosis:	Elevated troponin  Secondary Diagnosis:	Elevated lactic acid level  Secondary Diagnosis:	Hypomagnesemia  Secondary Diagnosis:	Other encephalopathy   1

## 2022-10-09 NOTE — ED ADULT NURSE REASSESSMENT NOTE - NS ED NURSE REASSESS COMMENT FT1
CT scan done pending results; ICU team at bedside evaluating pt; Levophed drip  maintained , titrated per BP; pt closely monitored

## 2022-10-09 NOTE — PROVIDER CONTACT NOTE (OTHER) - ASSESSMENT
left 18G in forarm, positive blood return, IV line flushing with no resistance. area of pale, non blanchable skin above IV site cool to touch. positive radial pulse, extremety warm to touch other than IV site.

## 2022-10-09 NOTE — ED PROVIDER NOTE - CLINICAL SUMMARY MEDICAL DECISION MAKING FREE TEXT BOX
81M from [facility] PMH HLD, BPH, anxiety/depression, recent CVA (had presented to [facility] w/ diplopia, slurred speech, weakness, confusion - intubated for low GCS, MR showing bithalamic stroke, was dc'd to NH on 9/21), p/w hypotension. Note from 10/8 1413 indicates that pt had magdaleno placed for urinary retention, was noted to have confusion and chills and low blood pressure, fast HR and pulse 135, was given 200ml NS and 2g ceftriaxone. Also placed on NC for ?resp distress. EMS called later in the evening for BP 79/58. EMS reports that pt was given 1L NS, remained hypotensive, was started on levophed.   Son Kamran at bedside providing additional info. Pt w/ several days of magdaleno trouble/hematuria. Had mild decreased appetite/confusion yesterday, worsening since this afternoon. Before 2d ago pt was able to ambulate, have basic conversations.  ddx: Encephalopathy, hypotension. Likely infectious. Likely . Possible other source.   Magdaleno exchange, labs, CTs.   IVF (already received 1L by EMS and 200ccs from NH). empiric abx. 81M from [facility] PMH HLD, BPH, anxiety/depression, recent CVA (had presented to [facility] w/ diplopia, slurred speech, weakness, confusion - intubated for low GCS, MR showing bithalamic stroke, was dc'd to NH on 9/21), p/w hypotension. Note from 10/8 1413 indicates that pt had magdaleno placed for urinary retention, was noted to have confusion and chills and low blood pressure, fast HR and pulse 135, was given 200ml NS and 2g ceftriaxone. Also placed on NC for ?resp distress. EMS called later in the evening for BP 79/58. EMS reports that pt was given 1L NS, remained hypotensive, was started on levophed.   Son Kamran at bedside providing additional info. Pt w/ several days of magdaleno trouble/hematuria. Had mild decreased appetite/confusion yesterday, worsening since this afternoon. Before 2d ago pt was able to ambulate, have basic conversations.  Borderline BP off levophed. Arrived on 4L NC. Quickly titrated to RA. Other vitals wnl. Exam as above.  Paperwork of advanced directives scanned into chart. DNR. Not DNI.   ddx: Encephalopathy, hypotension. Likely infectious. Likely . Possible other source.   Magdaleno exchange, labs, CTs.   IVF (already received 1L by EMS and 200ccs from NH). empiric abx.

## 2022-10-09 NOTE — CONSULT NOTE ADULT - SUBJECTIVE AND OBJECTIVE BOX
[facility] SERVICE CONSULTATION NOTE    Consult Reason: hypotension    CC: AMS and hypotension    HPI: 81 M with PMHx anxiety, BPH, diverticulitis, recent ischemic CVA s/p 39d admission at [facility] from [facility] due to concern for UTI.  Initially presented to [facility] on  for blurry vision, found to have bithalamic ischemic stroke on MRI. His course was complicated by an in hospital fall then subsequent unresponsiveness requiring intubation and MICU care      ROS:  Otherwise negative, except as specified in HPI.    PMH:    PSH:    FH:    SH:    ALLERGIES:    MEDICATIONS:    VITAL SIGNS:  ICU Vital Signs Last 24 Hrs  T(C): 38.1 (09 Oct 2022 00:55), Max: 38.1 (09 Oct 2022 00:55)  T(F): 100.6 (09 Oct 2022 00:55), Max: 100.6 (09 Oct 2022 00:55)  HR: 89 (09 Oct 2022 02:48) (84 - 93)  BP: 77/51 (09 Oct 2022 02:48) (77/51 - 108/67)  BP(mean): --  ABP: --  ABP(mean): --  RR: 16 (09 Oct 2022 02:48) (16 - 18)  SpO2: 98% (09 Oct 2022 02:48) (96% - 99%)    O2 Parameters below as of 09 Oct 2022 02:48  Patient On (Oxygen Delivery Method): room air          CAPILLARY BLOOD GLUCOSE          PHYSICAL EXAM:  Constitutional: resting comfortably in bed, NAD  HEENT: NC/AT; PER, anicteric sclera; no oropharyngeal erythema or exudates; MMM  Neck: supple, no appreciable JVD  Respiratory: CTA B/L, no W/R/R; respirations appear non-labored, conversive in full sentences  Cardiovascular: +S1/S2, RRR  Gastrointestinal: abdomen soft, NT/ND  Extremities: WWP; no clubbing, cyanosis or edema  Vascular: 2+ radial, femoral, and DP/PT pulses B/L  Dermatologic: skin normal color and turgor; no visible rashes  Neurological:     LABS:                        9.2    37.19 )-----------( 219      ( 09 Oct 2022 00:11 )             29.1     10-09    139  |  104  |  21  ----------------------------<  141<H>  3.9   |  22  |  1.19    Ca    8.4      09 Oct 2022 00:11  Mg     1.4     10-09    TPro  5.6<L>  /  Alb  3.2<L>  /  TBili  0.3  /  DBili  x   /  AST  74<H>  /  ALT  78<H>  /  AlkPhos  119  10-09    PT/INR - ( 09 Oct 2022 00:11 )   PT: 15.5 sec;   INR: 1.30          PTT - ( 09 Oct 2022 00:11 )  PTT:34.4 sec  Lactate, Blood: 4.3 mmol/L (10-09-22 @ 02:46)  Lactate, Blood: 4.1 mmol/L (10-09-22 @ 00:11)    CARDIAC MARKERS ( 09 Oct 2022 00:11 )  x     / 0.04 ng/mL / x     / x     / x          Urinalysis Basic - ( 09 Oct 2022 02:02 )    Color: Red / Appearance: Cloudy / S.020 / pH: x  Gluc: x / Ketone: NEGATIVE  / Bili: Negative / Urobili: 0.2 E.U./dL   Blood: x / Protein: 100 mg/dL / Nitrite: NEGATIVE   Leuk Esterase: Small / RBC: > 10 /HPF / WBC > 10 /HPF   Sq Epi: x / Non Sq Epi: 0-5 /HPF / Bacteria: Present /HPF      Blood Gas Profile w/Lytes - Venous: Performed in Lab (10-09-22 @ 00:13)      EKG: Reviewed.    RADIOLOGY & ADDITIONAL TESTS: Reviewed. [facility] SERVICE CONSULTATION NOTE    Consult Reason: hypotension    CC: AMS and hypotension    HPI: 81 M with PMHx anxiety, HTN, BPH, diverticulitis, recent ischemic CVA s/p 39d admission at [facility] from [facility] due to concern for UTI.  Initially presented to [facility] on  for blurry vision, found to have bithalamic ischemic stroke on MRI. His course was complicated by an in hospital fall then subsequent unresponsiveness requiring intubation and MICU care. He had 3 failed attempts at extubation first complicated by poor secretion management () then by stridor and airway edema (). Extubated  and weaned down to NC, but then could not tolerate HFNC and was reintubated; vanc/cef/flagyl were given for possible HAP (per son was MRSA but unknown if this was sputum culture or nare PCR). Extubated on  then transferred to floors, weaned to RA by .  Patient suffered from persistent dysphagia requiring PEG insertion which was done on , s/p delirious PEG removal on  with successful reinsertion. TOV intermittently failing requiring discharge with magdaleno catheter. Also diagnosed with Ileus which resolved with disimpaction and bowel regimen.  Discharged to [facility] on , vitals prior to discharge notable for HR 70s-80s BP 120s-130s/70s.  At rehab was starting to ambulate and take PO with supplemental PEG feeds. He was interactive and improving until morning of 10/8 where he was noted to be nonverbal and would not take PO. Vital signs at [facility] notable for BP 79/58 while supine, no temporal fever. Magdaleno was exchanged there and patient given 2g ceftriaxone and 200cc NS. BP did not respond so they notified EMS, who arrived and started norepinephrine.    ED Course:       ROS:  Otherwise negative, except as specified in HPI.    PMH: Anxiety, BPH, HTN    PSH:    FH: CVAs    SH:     ALLERGIES: NKDA    MEDICATIONS: sertraline 50, finasteride 5, tamsulosin 0.4, amlodipine 10, atorvastatin 80, esomeprazole 40, baclofen 10    VITAL SIGNS:  ICU Vital Signs Last 24 Hrs  T(C): 38.1 (09 Oct 2022 00:55), Max: 38.1 (09 Oct 2022 00:55)  T(F): 100.6 (09 Oct 2022 00:55), Max: 100.6 (09 Oct 2022 00:55)  HR: 89 (09 Oct 2022 02:48) (84 - 93)  BP: 77/51 (09 Oct 2022 02:48) (77/51 - 108/67)  BP(mean): --  ABP: --  ABP(mean): --  RR: 16 (09 Oct 2022 02:48) (16 - 18)  SpO2: 98% (09 Oct 2022 02:48) (96% - 99%)    O2 Parameters below as of 09 Oct 2022 02:48  Patient On (Oxygen Delivery Method): room air          CAPILLARY BLOOD GLUCOSE          PHYSICAL EXAM:  Constitutional: lethargic elderly man, in moderate distress  HEENT: PER, anicteric sclera; no oropharyngeal erythema or exudates; MMM  Neck: supple, no appreciable JVD  Respiratory: CTA B/L, no W/R/R; Kussmaul respirations noted  Cardiovascular: +S1/S2, RRR  Gastrointestinal: abdomen soft, NT/ND  Extremities: WWP; no clubbing, cyanosis or edema  Vascular: 2+ radial, femoral, and DP/PT pulses B/L  Dermatologic: skin normal color and turgor; no visible rashes  Neurological: nonverbal, nodded head once in assent to examination and turns head towards son to his voice, but otherwise does not follow commands. Withdraws to noxious stimuli with all extremities    LABS:                        9.2    37.19 )-----------( 219      ( 09 Oct 2022 00:11 )             29.1     10    139  |  104  |  21  ----------------------------<  141<H>  3.9   |  22  |  1.19    Ca    8.4      09 Oct 2022 00:11  Mg     1.4     10    TPro  5.6<L>  /  Alb  3.2<L>  /  TBili  0.3  /  DBili  x   /  AST  74<H>  /  ALT  78<H>  /  AlkPhos  119  10    PT/INR - ( 09 Oct 2022 00:11 )   PT: 15.5 sec;   INR: 1.30          PTT - ( 09 Oct 2022 00:11 )  PTT:34.4 sec  Lactate, Blood: 4.3 mmol/L (10-09-22 @ 02:46)  Lactate, Blood: 4.1 mmol/L (10-09-22 @ 00:11)    CARDIAC MARKERS ( 09 Oct 2022 00:11 )  x     / 0.04 ng/mL / x     / x     / x          Urinalysis Basic - ( 09 Oct 2022 02:02 )    Color: Red / Appearance: Cloudy / S.020 / pH: x  Gluc: x / Ketone: NEGATIVE  / Bili: Negative / Urobili: 0.2 E.U./dL   Blood: x / Protein: 100 mg/dL / Nitrite: NEGATIVE   Leuk Esterase: Small / RBC: > 10 /HPF / WBC > 10 /HPF   Sq Epi: x / Non Sq Epi: 0-5 /HPF / Bacteria: Present /HPF      Blood Gas Profile w/Lytes - Venous: Performed in Lab (10-09-22 @ 00:13)      EKG: Reviewed.    RADIOLOGY & ADDITIONAL TESTS: Reviewed.

## 2022-10-09 NOTE — H&P ADULT - NSHPPHYSICALEXAM_GEN_ALL_CORE
T(C): 38.1 (10-09-22 @ 00:55), Max: 38.1 (10-09-22 @ 00:55)  HR: 88 (10-09-22 @ 03:05) (84 - 93)  BP: 89/60 (10-09-22 @ 03:05) (77/51 - 108/67)  RR: 16 (10-09-22 @ 03:05) (16 - 18)  SpO2: 97% (10-09-22 @ 03:05) (96% - 99%)    Constitutional: lethargic elderly man, in moderate distress  HEENT: PER, anicteric sclera; no oropharyngeal erythema or exudates; MMM  Neck: supple, no appreciable JVD  Respiratory: CTA B/L, no W/R/R; Kussmaul respirations noted  Cardiovascular: +S1/S2, RRR  Gastrointestinal: abdomen soft, NT/ND  Extremities: WWP; no clubbing, cyanosis or edema  Vascular: 2+ radial, femoral, and DP/PT pulses B/L  Dermatologic: skin normal color and turgor; no visible rashes  Neurological: nonverbal, nodded head once in assent to examination and turns head towards son to his voice, but otherwise does not follow commands. Withdraws to noxious stimuli with all

## 2022-10-09 NOTE — PROVIDER CONTACT NOTE (OTHER) - BACKGROUND
patient admitted with hypotension, levophed dose has be decreasing throughout the shift. received patient on 15ml of levophed, currently getting 8 ml.

## 2022-10-09 NOTE — H&P ADULT - NSHPLABSRESULTS_GEN_ALL_CORE
.  LABS:                         9.2    37.19 )-----------( 219      ( 09 Oct 2022 00:11 )             29.1     10-09    139  |  104  |  21  ----------------------------<  141<H>  3.9   |  22  |  1.19    Ca    8.4      09 Oct 2022 00:11  Mg     1.4     10-09    TPro  5.6<L>  /  Alb  3.2<L>  /  TBili  0.3  /  DBili  x   /  AST  74<H>  /  ALT  78<H>  /  AlkPhos  119  10-09    PT/INR - ( 09 Oct 2022 00:11 )   PT: 15.5 sec;   INR: 1.30          PTT - ( 09 Oct 2022 00:11 )  PTT:34.4 sec  Urinalysis Basic - ( 09 Oct 2022 02:02 )    Color: Red / Appearance: Cloudy / S.020 / pH: x  Gluc: x / Ketone: NEGATIVE  / Bili: Negative / Urobili: 0.2 E.U./dL   Blood: x / Protein: 100 mg/dL / Nitrite: NEGATIVE   Leuk Esterase: Small / RBC: > 10 /HPF / WBC > 10 /HPF   Sq Epi: x / Non Sq Epi: 0-5 /HPF / Bacteria: Present /HPF      CARDIAC MARKERS ( 09 Oct 2022 00:11 )  x     / 0.04 ng/mL / x     / x     / x            Lactate, Blood: 4.3 mmol/L (10-09 @ 02:46)  Lactate, Blood: 4.1 mmol/L (10-09 @ 00:11)      RADIOLOGY, EKG & ADDITIONAL TESTS: Reviewed.

## 2022-10-09 NOTE — ED PROVIDER NOTE - PHYSICAL EXAMINATION
magdaleno appears in palce. dark yellow urine w/ sediment in bag. scant blood at penile urethral meatus.   PEG c/d/i  possible wincing w/ palpation of lower abdomen but rest of abdomen soft NTND.

## 2022-10-09 NOTE — H&P ADULT - ATTENDING COMMENTS
81 m w recent lengthy hospitalization for CVA admitted w worsening MS, hypotension , possible urosepsis.  Imaging studies reviewed.  S/p fluid resuscitation. cultured , on vanco/zosyn and levophed.  f/u cultures, taper vasopressors as able.  bladder/prostate findings noted on CT.  consider  eval

## 2022-10-09 NOTE — ED ADULT NURSE REASSESSMENT NOTE - NS ED NURSE REASSESS COMMENT FT1
Procedure has been cancelled for bronchosopy per Dr. Maki Yanez after being made aware of +covid test resulted on 9/1/22, spoke with patients nurse Tamara Butler on inpatient unit, she has since informed the patient of same and I will put in order , verbal order verified per Dr. Maki Yanez, same communicated to primary nurse.  Dr. Maki Yanez will revise plan for this procedure at a later date/[facility] [place] to MICU; handoff provided to YUNG Zazueta

## 2022-10-09 NOTE — H&P ADULT - HISTORY OF PRESENT ILLNESS
ED course:  Vitals: 100.6F, HR 84, BP 82/52, RR16 97% RA  Labs: WBC 37 w/ neutrophil predominance, Hb 9.2/Hct 29.1, lactate 4.1, BUN 19/Cr 1.19, Trop 0.04, UA >10 wbcs, Leukocyte esterase +  Imaging: EKG NSR  Interventions: tylenol, vanc x1, zosyn, 2L NS, levophed initiated 81 M with PMHx anxiety, HTN, BPH, diverticulitis, recent ischemic CVA s/p 39d admission at [facility] from [facility] due to concern for UTI.  Initially presented to [facility] on 8/12 for blurry vision, found to have bithalamic ischemic stroke on MRI. His course was complicated by an in hospital fall then subsequent unresponsiveness requiring intubation and MICU care. He had 3 failed attempts at extubation first complicated by poor secretion management (8/16) then by stridor and airway edema (8/19). Extubated 8/24 and weaned down to NC, but then could not tolerate HFNC and was reintubated; vanc/cef/flagyl were given for possible HAP (per son was MRSA but unknown if this was sputum culture or nare PCR). Extubated on 8/31 then transferred to floors, weaned to RA by 9/12.  Patient suffered from persistent dysphagia requiring PEG insertion which was done on 9/8, s/p delirious PEG removal on 9/14 with successful reinsertion. TOV intermittently failing requiring discharge with magdaleno catheter. Also diagnosed with Ileus which resolved with disimpaction and bowel regimen.    Discharged to [facility] on 9/21, vitals prior to discharge notable for HR 70s-80s BP 120s-130s/70s.  At rehab was starting to ambulate and take PO with supplemental PEG feeds. He was interactive and improving until morning of 10/8 where he was noted to be nonverbal and would not take PO. Vital signs at [facility] notable for BP 79/58 while supine, no temporal fever. Magdaleno was exchanged there and patient given 2g ceftriaxone and 200cc NS. BP did not respond so they notified EMS, who arrived and started norepinephrine.        ED course:  Vitals: 100.6F, HR 84, BP 82/52, RR16 97% RA  Labs: WBC 37 w/ neutrophil predominance, Hb 9.2/Hct 29.1, lactate 4.1, BUN 19/Cr 1.19, Trop 0.04, UA >10 wbcs, Leukocyte esterase +  Imaging: EKG NSR  Interventions: tylenol, vanc x1, zosyn, 2L NS, levophed initiated 81 M with PMHx anxiety, HTN, BPH, diverticulitis, recent ischemic CVA s/p 39d admission at [facility] from [facility] due to concern for UTI.  Initially presented to [facility] on 8/12 for blurry vision, found to have bithalamic ischemic stroke on MRI. His course was complicated by an in hospital fall then subsequent unresponsiveness requiring intubation and MICU care. He had 3 failed attempts at extubation first complicated by poor secretion management (8/16) then by stridor and airway edema (8/19). Extubated 8/24 and weaned down to NC, but then could not tolerate HFNC and was reintubated; vanc/cef/flagyl were given for possible HAP (per son was MRSA but unknown if this was sputum culture or nare PCR). Extubated on 8/31 then transferred to floors, weaned to RA by 9/12.  Patient suffered from persistent dysphagia requiring PEG insertion which was done on 9/8, s/p delirious PEG removal on 9/14 with successful reinsertion. TOV intermittently failing requiring discharge with magdaleno catheter. Also diagnosed with Ileus which resolved with disimpaction and bowel regimen.    Discharged to [facility] on 9/21, vitals prior to discharge notable for HR 70s-80s BP 120s-130s/70s.  At rehab was starting to ambulate and take PO with supplemental PEG feeds. He was interactive and improving until morning of 10/8 where he was noted to be nonverbal and would not take PO. Vital signs at [facility] notable for BP 79/58 while supine, no temporal fever. Magdaleno was exchanged there and patient given 2g ceftriaxone and 200cc NS. BP did not respond so they notified EMS, who arrived and started norepinephrine.        ED course:  Vitals: 100.6F, HR 84, BP 82/52 prompting initiation of levo, RR16 97% RA  Labs: WBC 37 w/ neutrophil predominance, Hb 9.2/Hct 29.1, lactate 4.1, BUN 19/Cr 1.19, Trop 0.04, UA >10 wbcs, Leukocyte esterase +  Imaging: EKG NSR with Qs in V1-2  Interventions: tylenol, vanc x1, zosyn, 2L NS, levophed initiated for MAP<65 81 M with PMHx anxiety, HTN, BPH, diverticulitis, recent ischemic CVA s/p 39d admission at [facility] from [facility] due to concern for UTI.  Initially presented to [facility] on 8/12 for blurry vision, found to have bithalamic ischemic stroke on MRI. His course was complicated by an in hospital fall then subsequent unresponsiveness requiring intubation and MICU care. He had 3 failed attempts at extubation first complicated by poor secretion management (8/16) then by stridor and airway edema (8/19). Extubated 8/24 and weaned down to NC, but then could not tolerate HFNC and was reintubated; vanc/cef/flagyl were given for possible HAP (per son was MRSA but unknown if this was sputum culture or nare PCR). Extubated on 8/31 then transferred to floors, weaned to RA by 9/12.  Patient suffered from persistent dysphagia requiring PEG insertion which was done on 9/8, s/p delirious PEG removal on 9/14 with successful reinsertion. TOV intermittently failing requiring discharge with magdaleno catheter. Also diagnosed with Ileus which resolved with disimpaction and bowel regimen.    Discharged to [facility] on 9/21, vitals prior to discharge notable for HR 70s-80s BP 120s-130s/70s.  At rehab was starting to ambulate and take PO with supplemental PEG feeds. He was interactive and improving until morning of 10/8 where he was noted to be nonverbal and would not take PO. Vital signs at [facility] notable for BP 79/58 while supine, no temporal fever. Magdaleno was exchanged there and patient given 2g ceftriaxone and 200cc NS. BP did not respond so they notified EMS, who arrived and started norepinephrine.      ED course:  Vitals: 100.6F, HR 84, BP 82/52 prompting initiation of levo, RR16 97% RA  Labs: WBC 37 w/ neutrophil predominance, Hb 9.2/Hct 29.1, lactate 4.1, BUN 19/Cr 1.19, Trop 0.04, UA >10 wbcs, Leukocyte esterase +  Imaging: EKG NSR with Qs in V1-2  Interventions: tylenol, vanc x1, zosyn, 2L NS, levophed initiated for MAP<65

## 2022-10-09 NOTE — PROGRESS NOTE ADULT - SUBJECTIVE AND OBJECTIVE BOX
Patient is a 81y old  Male who presents with a chief complaint of Septic Shock (09 Oct 2022 03:10)      INTERVAL HPI/OVERNIGHT EVENTS:   No overnight events   Afebrile, hemodynamically stable     Subjective: Patient seen and examined at bedside.    ICU Vital Signs Last 24 Hrs  T(C): 38.1 (09 Oct 2022 00:55), Max: 38.1 (09 Oct 2022 00:55)  T(F): 100.6 (09 Oct 2022 00:55), Max: 100.6 (09 Oct 2022 00:55)  HR: 74 (09 Oct 2022 06:00) (60 - 93)  BP: 108/61 (09 Oct 2022 06:00) (77/51 - 111/70)  BP(mean): 76 (09 Oct 2022 06:00) (64 - 86)  ABP: --  ABP(mean): --  RR: 14 (09 Oct 2022 06:00) (12 - 18)  SpO2: 95% (09 Oct 2022 06:00) (94% - 99%)    O2 Parameters below as of 09 Oct 2022 06:00  Patient On (Oxygen Delivery Method): room air          I&O's Summary    08 Oct 2022 07:01  -  09 Oct 2022 06:46  --------------------------------------------------------  IN: 65.5 mL / OUT: 355 mL / NET: -289.5 mL          PHYSICAL EXAM:  GENERAL: No acute distress   HEAD:  Atraumatic, Normocephalic  EYES: EOMI, PERRLA, conjunctiva and sclera clear  ENMT: No tonsillar erythema, exudates, or enlargement; Moist mucous membranes  NECK: Supple, No JVD, Normal thyroid  HEART: Regular rate and rhythm; No murmurs, rubs, or gallops  RESPIRATORY: CTA B/L, No W/R/R  ABDOMEN: Soft, Nontender, Nondistended; Bowel sounds present  NEUROLOGY: A&Ox3, nonfocal, moving all extremities  EXTREMITIES:  2+ Peripheral Pulses, No clubbing, cyanosis, or edema  SKIN: warm, dry, normal color, no rash or abnormal lesions    LABS:                        9.6    52.17 )-----------( 213      ( 09 Oct 2022 05:04 )             30.3     10-09    141  |  108  |  22  ----------------------------<  187<H>  4.4   |  18<L>  |  1.03    Ca    8.3<L>      09 Oct 2022 05:04  Phos  4.1     10-09  Mg     1.8     10-09    TPro  5.6<L>  /  Alb  3.2<L>  /  TBili  0.3  /  DBili  x   /  AST  74<H>  /  ALT  78<H>  /  AlkPhos  119  10-09    PT/INR - ( 09 Oct 2022 00:11 )   PT: 15.5 sec;   INR: 1.30          PTT - ( 09 Oct 2022 00:11 )  PTT:34.4 sec  Urinalysis Basic - ( 09 Oct 2022 02:02 )    Color: Red / Appearance: Cloudy / S.020 / pH: x  Gluc: x / Ketone: NEGATIVE  / Bili: Negative / Urobili: 0.2 E.U./dL   Blood: x / Protein: 100 mg/dL / Nitrite: NEGATIVE   Leuk Esterase: Small / RBC: > 10 /HPF / WBC > 10 /HPF   Sq Epi: x / Non Sq Epi: 0-5 /HPF / Bacteria: Present /HPF      CAPILLARY BLOOD GLUCOSE            Consultant(s) Notes Reviewed:  [x ] YES  [ ] NO    MEDICATIONS  (STANDING):  aspirin  chewable 81 milliGRAM(s) Enteral Tube daily  atorvastatin 80 milliGRAM(s) Oral every 24 hours  chlorhexidine 2% Cloths 1 Application(s) Topical <User Schedule>  enoxaparin Injectable 40 milliGRAM(s) SubCutaneous every 24 hours  norepinephrine Infusion 0.05 MICROgram(s)/kG/Min (6.97 mL/Hr) IV Continuous <Continuous>  pantoprazole    Tablet 40 milliGRAM(s) Oral every 24 hours  piperacillin/tazobactam IVPB.. 3.375 Gram(s) IV Intermittent every 8 hours  sertraline 50 milliGRAM(s) Oral every 24 hours    MEDICATIONS  (PRN):      Care Discussed with Consultants/Other Providers [ x] YES  [ ] NO    RADIOLOGY & ADDITIONAL TESTS: INTERVAL HPI/OVERNIGHT EVENTS: Admitted to [facility]. repeat lactate 4.3. Continued zosyn and vanc 1mg q24hr. POCUS with a-lines and plump IVC, giving additional 500cc LR. did not start trickle feeds given sepsis.     Subjective: Patient seen and examined at bedside. Unable to verbalize symptoms however can nod yes or no when asked ROS. Currently denies CP, SOB, N/V, F/C.     ICU Vital Signs Last 24 Hrs  T(C): 38.1 (09 Oct 2022 00:55), Max: 38.1 (09 Oct 2022 00:55)  T(F): 100.6 (09 Oct 2022 00:55), Max: 100.6 (09 Oct 2022 00:55)  HR: 74 (09 Oct 2022 06:00) (60 - 93)  BP: 108/61 (09 Oct 2022 06:00) (77/51 - 111/70)  BP(mean): 76 (09 Oct 2022 06:00) (64 - 86)  ABP: --  ABP(mean): --  RR: 14 (09 Oct 2022 06:00) (12 - 18)  SpO2: 95% (09 Oct 2022 06:00) (94% - 99%)    O2 Parameters below as of 09 Oct 2022 06:00  Patient On (Oxygen Delivery Method): room air          I&O's Summary    08 Oct 2022 07:01  -  09 Oct 2022 06:46  --------------------------------------------------------  IN: 65.5 mL / OUT: 355 mL / NET: -289.5 mL          PHYSICAL EXAM:  Constitutional: lethargic elderly man, NAD on room air, able to nod in response to questions   HEENT: PER, anicteric sclera; no oropharyngeal erythema or exudates; MMM  Neck: supple, no appreciable JVD  Respiratory: CTA B/L, no W/R/R  Cardiovascular: +S1/S2, RRR  Gastrointestinal: abdomen soft, NT/ND  Extremities: WWP; no clubbing, cyanosis or edema  Vascular: 2+ radial, femoral, and DP/PT pulses B/L  Dermatologic: skin normal color and turgor; no visible rashes  Neurological: nonverbal, nodded head occasionally in response to ROS,  and turns head towards son to his voice, but otherwise does not follow commands. Withdraws to noxious stimuli     LABS:                        9.6    52.17 )-----------( 213      ( 09 Oct 2022 05:04 )             30.3     10    141  |  108  |  22  ----------------------------<  187<H>  4.4   |  18<L>  |  1.03    Ca    8.3<L>      09 Oct 2022 05:04  Phos  4.1     10  Mg     1.8     10-09    TPro  5.6<L>  /  Alb  3.2<L>  /  TBili  0.3  /  DBili  x   /  AST  74<H>  /  ALT  78<H>  /  AlkPhos  119  10-    PT/INR - ( 09 Oct 2022 00:11 )   PT: 15.5 sec;   INR: 1.30          PTT - ( 09 Oct 2022 00:11 )  PTT:34.4 sec  Urinalysis Basic - ( 09 Oct 2022 02:02 )    Color: Red / Appearance: Cloudy / S.020 / pH: x  Gluc: x / Ketone: NEGATIVE  / Bili: Negative / Urobili: 0.2 E.U./dL   Blood: x / Protein: 100 mg/dL / Nitrite: NEGATIVE   Leuk Esterase: Small / RBC: > 10 /HPF / WBC > 10 /HPF   Sq Epi: x / Non Sq Epi: 0-5 /HPF / Bacteria: Present /HPF      CAPILLARY BLOOD GLUCOSE            Consultant(s) Notes Reviewed:  [x ] YES  [ ] NO    MEDICATIONS  (STANDING):  aspirin  chewable 81 milliGRAM(s) Enteral Tube daily  atorvastatin 80 milliGRAM(s) Oral every 24 hours  chlorhexidine 2% Cloths 1 Application(s) Topical <User Schedule>  enoxaparin Injectable 40 milliGRAM(s) SubCutaneous every 24 hours  norepinephrine Infusion 0.05 MICROgram(s)/kG/Min (6.97 mL/Hr) IV Continuous <Continuous>  pantoprazole    Tablet 40 milliGRAM(s) Oral every 24 hours  piperacillin/tazobactam IVPB.. 3.375 Gram(s) IV Intermittent every 8 hours  sertraline 50 milliGRAM(s) Oral every 24 hours    MEDICATIONS  (PRN):      Care Discussed with Consultants/Other Providers [ x] YES  [ ] NO    RADIOLOGY & ADDITIONAL TESTS:

## 2022-10-09 NOTE — PROVIDER CONTACT NOTE (OTHER) - SITUATION
patient with levophed running through an 18G IV in left forearm. IV noted to have non blanchable pale skin cool to touch above IV site.

## 2022-10-10 DIAGNOSIS — N40.0 BENIGN PROSTATIC HYPERPLASIA WITHOUT LOWER URINARY TRACT SYMPTOMS: ICD-10-CM

## 2022-10-10 DIAGNOSIS — Z29.9 ENCOUNTER FOR PROPHYLACTIC MEASURES, UNSPECIFIED: ICD-10-CM

## 2022-10-10 DIAGNOSIS — N39.0 URINARY TRACT INFECTION, SITE NOT SPECIFIED: ICD-10-CM

## 2022-10-10 DIAGNOSIS — G93.41 METABOLIC ENCEPHALOPATHY: ICD-10-CM

## 2022-10-10 DIAGNOSIS — J69.0 PNEUMONITIS DUE TO INHALATION OF FOOD AND VOMIT: ICD-10-CM

## 2022-10-10 DIAGNOSIS — I69.391 DYSPHAGIA FOLLOWING CEREBRAL INFARCTION: ICD-10-CM

## 2022-10-10 LAB
ALBUMIN SERPL ELPH-MCNC: 2.7 G/DL — LOW (ref 3.3–5)
ALP SERPL-CCNC: 108 U/L — SIGNIFICANT CHANGE UP (ref 40–120)
ALT FLD-CCNC: 44 U/L — SIGNIFICANT CHANGE UP (ref 10–45)
ANION GAP SERPL CALC-SCNC: 10 MMOL/L — SIGNIFICANT CHANGE UP (ref 5–17)
ANISOCYTOSIS BLD QL: SLIGHT — SIGNIFICANT CHANGE UP
AST SERPL-CCNC: 26 U/L — SIGNIFICANT CHANGE UP (ref 10–40)
BASOPHILS # BLD AUTO: 0.66 K/UL — HIGH (ref 0–0.2)
BASOPHILS NFR BLD AUTO: 1.7 % — SIGNIFICANT CHANGE UP (ref 0–2)
BILIRUB SERPL-MCNC: 0.4 MG/DL — SIGNIFICANT CHANGE UP (ref 0.2–1.2)
BUN SERPL-MCNC: 20 MG/DL — SIGNIFICANT CHANGE UP (ref 7–23)
BURR CELLS BLD QL SMEAR: PRESENT — SIGNIFICANT CHANGE UP
CALCIUM SERPL-MCNC: 8.5 MG/DL — SIGNIFICANT CHANGE UP (ref 8.4–10.5)
CHLORIDE SERPL-SCNC: 108 MMOL/L — SIGNIFICANT CHANGE UP (ref 96–108)
CO2 SERPL-SCNC: 23 MMOL/L — SIGNIFICANT CHANGE UP (ref 22–31)
CREAT SERPL-MCNC: 0.88 MG/DL — SIGNIFICANT CHANGE UP (ref 0.5–1.3)
CULTURE RESULTS: SIGNIFICANT CHANGE UP
EGFR: 87 ML/MIN/1.73M2 — SIGNIFICANT CHANGE UP
EOSINOPHIL # BLD AUTO: 0.35 K/UL — SIGNIFICANT CHANGE UP (ref 0–0.5)
EOSINOPHIL NFR BLD AUTO: 0.9 % — SIGNIFICANT CHANGE UP (ref 0–6)
GLUCOSE BLDC GLUCOMTR-MCNC: 102 MG/DL — HIGH (ref 70–99)
GLUCOSE BLDC GLUCOMTR-MCNC: 107 MG/DL — HIGH (ref 70–99)
GLUCOSE SERPL-MCNC: 101 MG/DL — HIGH (ref 70–99)
HCT VFR BLD CALC: 24.2 % — LOW (ref 39–50)
HGB BLD-MCNC: 7.9 G/DL — LOW (ref 13–17)
HYPOCHROMIA BLD QL: SLIGHT — SIGNIFICANT CHANGE UP
LYMPHOCYTES # BLD AUTO: 1.68 K/UL — SIGNIFICANT CHANGE UP (ref 1–3.3)
LYMPHOCYTES # BLD AUTO: 4.3 % — LOW (ref 13–44)
MACROCYTES BLD QL: SLIGHT — SIGNIFICANT CHANGE UP
MAGNESIUM SERPL-MCNC: 2 MG/DL — SIGNIFICANT CHANGE UP (ref 1.6–2.6)
MANUAL SMEAR VERIFICATION: SIGNIFICANT CHANGE UP
MCHC RBC-ENTMCNC: 30.4 PG — SIGNIFICANT CHANGE UP (ref 27–34)
MCHC RBC-ENTMCNC: 32.6 GM/DL — SIGNIFICANT CHANGE UP (ref 32–36)
MCV RBC AUTO: 93.1 FL — SIGNIFICANT CHANGE UP (ref 80–100)
MONOCYTES # BLD AUTO: 1.01 K/UL — HIGH (ref 0–0.9)
MONOCYTES NFR BLD AUTO: 2.6 % — SIGNIFICANT CHANGE UP (ref 2–14)
NEUTROPHILS # BLD AUTO: 35.29 K/UL — HIGH (ref 1.8–7.4)
NEUTROPHILS NFR BLD AUTO: 89.6 % — HIGH (ref 43–77)
NEUTS BAND # BLD: 0.9 % — SIGNIFICANT CHANGE UP (ref 0–8)
OVALOCYTES BLD QL SMEAR: SLIGHT — SIGNIFICANT CHANGE UP
PHOSPHATE SERPL-MCNC: 2.6 MG/DL — SIGNIFICANT CHANGE UP (ref 2.5–4.5)
PLAT MORPH BLD: ABNORMAL
PLATELET # BLD AUTO: 166 K/UL — SIGNIFICANT CHANGE UP (ref 150–400)
POIKILOCYTOSIS BLD QL AUTO: SIGNIFICANT CHANGE UP
POLYCHROMASIA BLD QL SMEAR: SLIGHT — SIGNIFICANT CHANGE UP
POTASSIUM SERPL-MCNC: 3.4 MMOL/L — LOW (ref 3.5–5.3)
POTASSIUM SERPL-SCNC: 3.4 MMOL/L — LOW (ref 3.5–5.3)
PROT SERPL-MCNC: 5.3 G/DL — LOW (ref 6–8.3)
RBC # BLD: 2.6 M/UL — LOW (ref 4.2–5.8)
RBC # FLD: 15.9 % — HIGH (ref 10.3–14.5)
RBC BLD AUTO: ABNORMAL
SODIUM SERPL-SCNC: 141 MMOL/L — SIGNIFICANT CHANGE UP (ref 135–145)
SPECIMEN SOURCE: SIGNIFICANT CHANGE UP
WBC # BLD: 38.99 K/UL — HIGH (ref 3.8–10.5)
WBC # FLD AUTO: 38.99 K/UL — HIGH (ref 3.8–10.5)

## 2022-10-10 PROCEDURE — 99233 SBSQ HOSP IP/OBS HIGH 50: CPT | Mod: GC

## 2022-10-10 RX ORDER — POTASSIUM CHLORIDE 20 MEQ
10 PACKET (EA) ORAL
Refills: 0 | Status: COMPLETED | OUTPATIENT
Start: 2022-10-10 | End: 2022-10-10

## 2022-10-10 RX ORDER — PANTOPRAZOLE SODIUM 20 MG/1
40 TABLET, DELAYED RELEASE ORAL EVERY 24 HOURS
Refills: 0 | Status: DISCONTINUED | OUTPATIENT
Start: 2022-10-11 | End: 2022-10-15

## 2022-10-10 RX ADMIN — CHLORHEXIDINE GLUCONATE 1 APPLICATION(S): 213 SOLUTION TOPICAL at 05:39

## 2022-10-10 RX ADMIN — Medication 250 MILLIGRAM(S): at 00:33

## 2022-10-10 RX ADMIN — Medication 100 MILLIEQUIVALENT(S): at 07:43

## 2022-10-10 RX ADMIN — PIPERACILLIN AND TAZOBACTAM 25 GRAM(S): 4; .5 INJECTION, POWDER, LYOPHILIZED, FOR SOLUTION INTRAVENOUS at 17:11

## 2022-10-10 RX ADMIN — Medication 100 MILLIEQUIVALENT(S): at 08:52

## 2022-10-10 RX ADMIN — PANTOPRAZOLE SODIUM 40 MILLIGRAM(S): 20 TABLET, DELAYED RELEASE ORAL at 05:39

## 2022-10-10 RX ADMIN — ENOXAPARIN SODIUM 40 MILLIGRAM(S): 100 INJECTION SUBCUTANEOUS at 08:52

## 2022-10-10 RX ADMIN — SERTRALINE 50 MILLIGRAM(S): 25 TABLET, FILM COATED ORAL at 10:13

## 2022-10-10 RX ADMIN — Medication 100 MILLIEQUIVALENT(S): at 06:54

## 2022-10-10 RX ADMIN — Medication 81 MILLIGRAM(S): at 12:40

## 2022-10-10 RX ADMIN — PIPERACILLIN AND TAZOBACTAM 25 GRAM(S): 4; .5 INJECTION, POWDER, LYOPHILIZED, FOR SOLUTION INTRAVENOUS at 00:03

## 2022-10-10 RX ADMIN — PIPERACILLIN AND TAZOBACTAM 25 GRAM(S): 4; .5 INJECTION, POWDER, LYOPHILIZED, FOR SOLUTION INTRAVENOUS at 08:52

## 2022-10-10 NOTE — PROGRESS NOTE ADULT - PROBLEM SELECTOR PLAN 4
Per H&P, pt initially presented to [facility] on 8/12/22 for blurry vision, found to have bithalamic ischemic stroke on MRI. His course was complicated by an in hospital fall then subsequent unresponsiveness requiring intubation and MICU care with 3 failed attempts at extubation first complicated by poor secretion management (8/16) then by stridor and airway edema (8/19). Extubated 8/24 and weaned down to NC, but then could not tolerate HFNC and was reintubated; vanc/cef/flagyl were given for possible HAP leading to extubation on 8/31, weaned to RA by 9/12. Per notes, patient suffered from persistent dysphagia requiring PEG insertion which was done on 9/8, s/p delirious PEG removal on 9/14 with successful reinsertion. On examination on [facility] on 10/10/2022, PEG site clear and without bleeding or discharge/erythema. CT chest w/ IV contrast on 10/9/2022 show percutaneous gastrostomy tube within the stomach lumen with colonic diverticulosis.   - Pt to be consulted by S&S today, 10/10/2022  - If cleared by S&S, can resume feeds and PO medications

## 2022-10-10 NOTE — SWALLOW BEDSIDE ASSESSMENT ADULT - MODE OF PRESENTATION
2x tsp thin, 2x cup sip thin; 2x tsp applesauce; 1 bite joselito cracker./cup/spoon/fed by clinician

## 2022-10-10 NOTE — SWALLOW BEDSIDE ASSESSMENT ADULT - ORAL PREPARATORY PHASE
Mildly decreased labial seal; no ant loss w liquids, but poor bolus manipulation w chewable solid w a vertical chew pattern & slight ant loss with small pieces./Decreased mastication ability

## 2022-10-10 NOTE — SWALLOW BEDSIDE ASSESSMENT ADULT - PHARYNGEAL PHASE
Hyolaryngeal excursion palpated during swallow trigger. No change in voice (remains slightly wet), no throat clear or cough following PO.

## 2022-10-10 NOTE — PROGRESS NOTE ADULT - PROBLEM SELECTOR PLAN 2
Pt with magdaleno in place that was exchanged in ED on 10/9/2022. Currently being treated w/ zosyn for aspiration PNA. Given pt's AMS and inability to obtain reliable ROS, recommend treating empirically for UA.   - F/u w/ urine cxs   - Assess mental status daily

## 2022-10-10 NOTE — PROGRESS NOTE ADULT - ATTENDING COMMENTS
80 y/o M HLD, BPH requiring chronic magdaleno, diverticulitis, anxiety, depression with recent admission to [facility] for stroke (39 day admission, c/b multiple intubations, dysphagia and PEG tube placement, d/c to nursing home in stable condition) who presented with septic shock and metabolic encephalopathy likely due to complicated UTI and aspiration PNA  - c/w Zosyn for complicated UTI due to Magdaleno as well as HAP/aspiration gram negative coverage  - mental status improving, will keep NPO with tube feeds until FEES  - aspiration precautions  - restart finasteride, hold flomax given soft BP  - will attempt TOV tomorrow  - PT consult    Discussed with patient, HHA and son at bedside

## 2022-10-10 NOTE — PROGRESS NOTE ADULT - PROBLEM SELECTOR PLAN 1
Pt with AMS likely 2/2 to UTI vs aspiration PNA. Per MICU team, aspiration PNA unlikely to be cause of AMS given equivocal CXR results. CT chest showing Pulmonary opacities in both lower lobes due to atelectasis/pneumonia.  - C/w zosyn for mgmt of aspiration PNA and UTI   - Obtain more collateral from [facility]

## 2022-10-10 NOTE — PROGRESS NOTE ADULT - PROBLEM SELECTOR PLAN 3
CXR showing discoid change and/or infiltrate left lung base. Pt currently satting 98% on RA, currently protecting airway.   - C/w zosyn for possible aspiration PNA   - Trend leukocytosis which is currently downtrending but elevated   - Continue to assess mental status daily

## 2022-10-10 NOTE — PROGRESS NOTE ADULT - ATTENDING COMMENTS
Acute encephalopathy with UTI (perinephric strand) with septic shock (on levophed, titrated and d/c) with h/o chronic magdaleno. D/c vancomycin and zosyn. Culture negative so far. Rest as above.

## 2022-10-10 NOTE — PROGRESS NOTE ADULT - SUBJECTIVE AND OBJECTIVE BOX
***Transfer MICU to [facility]***      INTERVAL HPI/OVERNIGHT EVENTS: Admitted to [facility]. repeat lactate 4.3. Continued zosyn and vanc 1mg q24hr. POCUS with a-lines and plump IVC, giving additional 500cc LR. did not start trickle feeds given sepsis.     Subjective: Patient seen and examined at bedside. Unable to verbalize symptoms however can nod yes or no when asked ROS. Currently denies CP, SOB, N/V, F/C.     ICU Vital Signs Last 24 Hrs  T(C): 38.1 (09 Oct 2022 00:55), Max: 38.1 (09 Oct 2022 00:55)  T(F): 100.6 (09 Oct 2022 00:55), Max: 100.6 (09 Oct 2022 00:55)  HR: 74 (09 Oct 2022 06:00) (60 - 93)  BP: 108/61 (09 Oct 2022 06:00) (77/51 - 111/70)  BP(mean): 76 (09 Oct 2022 06:00) (64 - 86)  ABP: --  ABP(mean): --  RR: 14 (09 Oct 2022 06:00) (12 - 18)  SpO2: 95% (09 Oct 2022 06:00) (94% - 99%)    O2 Parameters below as of 09 Oct 2022 06:00  Patient On (Oxygen Delivery Method): room air          I&O's Summary    08 Oct 2022 07:01  -  09 Oct 2022 06:46  --------------------------------------------------------  IN: 65.5 mL / OUT: 355 mL / NET: -289.5 mL          PHYSICAL EXAM:  Constitutional: lethargic elderly man, NAD on room air, able to nod in response to questions   HEENT: PER, anicteric sclera; no oropharyngeal erythema or exudates; MMM  Neck: supple, no appreciable JVD  Respiratory: CTA B/L, no W/R/R  Cardiovascular: +S1/S2, RRR  Gastrointestinal: abdomen soft, NT/ND  Extremities: WWP; no clubbing, cyanosis or edema  Vascular: 2+ radial, femoral, and DP/PT pulses B/L  Dermatologic: skin normal color and turgor; no visible rashes  Neurological: nonverbal, nodded head occasionally in response to ROS,  and turns head towards son to his voice, but otherwise does not follow commands. Withdraws to noxious stimuli     LABS:                        9.6    52.17 )-----------( 213      ( 09 Oct 2022 05:04 )             30.3     10    141  |  108  |  22  ----------------------------<  187<H>  4.4   |  18<L>  |  1.03    Ca    8.3<L>      09 Oct 2022 05:04  Phos  4.1     10  Mg     1.8     10-09    TPro  5.6<L>  /  Alb  3.2<L>  /  TBili  0.3  /  DBili  x   /  AST  74<H>  /  ALT  78<H>  /  AlkPhos  119  10    PT/INR - ( 09 Oct 2022 00:11 )   PT: 15.5 sec;   INR: 1.30          PTT - ( 09 Oct 2022 00:11 )  PTT:34.4 sec  Urinalysis Basic - ( 09 Oct 2022 02:02 )    Color: Red / Appearance: Cloudy / S.020 / pH: x  Gluc: x / Ketone: NEGATIVE  / Bili: Negative / Urobili: 0.2 E.U./dL   Blood: x / Protein: 100 mg/dL / Nitrite: NEGATIVE   Leuk Esterase: Small / RBC: > 10 /HPF / WBC > 10 /HPF   Sq Epi: x / Non Sq Epi: 0-5 /HPF / Bacteria: Present /HPF      CAPILLARY BLOOD GLUCOSE            Consultant(s) Notes Reviewed:  [x ] YES  [ ] NO    MEDICATIONS  (STANDING):  aspirin  chewable 81 milliGRAM(s) Enteral Tube daily  atorvastatin 80 milliGRAM(s) Oral every 24 hours  chlorhexidine 2% Cloths 1 Application(s) Topical <User Schedule>  enoxaparin Injectable 40 milliGRAM(s) SubCutaneous every 24 hours  norepinephrine Infusion 0.05 MICROgram(s)/kG/Min (6.97 mL/Hr) IV Continuous <Continuous>  pantoprazole    Tablet 40 milliGRAM(s) Oral every 24 hours  piperacillin/tazobactam IVPB.. 3.375 Gram(s) IV Intermittent every 8 hours  sertraline 50 milliGRAM(s) Oral every 24 hours    MEDICATIONS  (PRN):      Care Discussed with Consultants/Other Providers [ x] YES  [ ] NO    RADIOLOGY & ADDITIONAL TESTS: ***Transfer MICU to [facility]***    HOSPITAL COURSE: Patient is a 80 y/o M HLD, BPH requiring chronic magdaleno, diverticulitis, anxiety, depression with recent admission to [facility] for stroke (39 day admission, c/b multiple intubations, dysphagia and PEG tube placement, d/c to nursing home in stable condition) now presenting with AMS and hypotension found to be septic 2/2 UTI requiring levophed. Per son, pt was improving with good PO intake, with mental status improving until night of 10/8 when pt was found to be nonverbal with poor PO intake, subsequently found to have BP of 79/58 at nursing home and EMS was called. On admission to the ED: UA positive, magdaleno was exchanged, given 2g CTX, and pt admitted to MICU for pressor requirements. Encephalopathy was attributed to UTI, for which pt currently being managed with Zosyn iso previous prolonged hospitalization and chronic magdaleno. Levophed was weaned off. Mental status improving since admission, now able to converse in short sentences, baseline s/p stroke per son. Formal s/s evaluation: recs PEG feeds as primary nutrition with ice chips for oral hydration and swallowing practice. Pt stable for RMF.       INTERVAL HPI/OVERNIGHT EVENTS: Admitted to [facility]. repeat lactate 4.3. Continued zosyn and vanc 1mg q24hr. POCUS with a-lines and plump IVC, giving additional 500cc LR. did not start trickle feeds given sepsis.     Subjective: Patient seen and examined at bedside. Unable to verbalize symptoms however can nod yes or no when asked ROS. Currently denies CP, SOB, N/V, F/C.     ICU Vital Signs Last 24 Hrs  T(C): 38.1 (09 Oct 2022 00:55), Max: 38.1 (09 Oct 2022 00:55)  T(F): 100.6 (09 Oct 2022 00:55), Max: 100.6 (09 Oct 2022 00:55)  HR: 74 (09 Oct 2022 06:00) (60 - 93)  BP: 108/61 (09 Oct 2022 06:00) (77/51 - 111/70)  BP(mean): 76 (09 Oct 2022 06:00) (64 - 86)  ABP: --  ABP(mean): --  RR: 14 (09 Oct 2022 06:00) (12 - 18)  SpO2: 95% (09 Oct 2022 06:00) (94% - 99%)    O2 Parameters below as of 09 Oct 2022 06:00  Patient On (Oxygen Delivery Method): room air          I&O's Summary    08 Oct 2022 07:01  -  09 Oct 2022 06:46  --------------------------------------------------------  IN: 65.5 mL / OUT: 355 mL / NET: -289.5 mL      PHYSICAL EXAM:  Constitutional: lethargic elderly man, NAD on room air, able to communicate in short 2-3 word sentences   HEENT: PER, anicteric sclera; no oropharyngeal erythema or exudates; MMM  Neck: supple, no appreciable JVD  Respiratory: CTA B/L, no W/R/R  Cardiovascular: +S1/S2, RRR  Gastrointestinal: abdomen soft, NT/ND  Extremities: WWP; no clubbing, cyanosis or edema  Vascular: 2+ radial, femoral, and DP/PT pulses B/L  Dermatologic: skin normal color and turgor; no visible rashes  Neurological: AOx3, withdraws to noxious stimuli     LABS:                        9.6    52.17 )-----------( 213      ( 09 Oct 2022 05:04 )             30.3     10-    141  |  108  |  22  ----------------------------<  187<H>  4.4   |  18<L>  |  1.03    Ca    8.3<L>      09 Oct 2022 05:04  Phos  4.1     10-  Mg     1.8     10-    TPro  5.6<L>  /  Alb  3.2<L>  /  TBili  0.3  /  DBili  x   /  AST  74<H>  /  ALT  78<H>  /  AlkPhos  119  10-09    PT/INR - ( 09 Oct 2022 00:11 )   PT: 15.5 sec;   INR: 1.30          PTT - ( 09 Oct 2022 00:11 )  PTT:34.4 sec  Urinalysis Basic - ( 09 Oct 2022 02:02 )    Color: Red / Appearance: Cloudy / S.020 / pH: x  Gluc: x / Ketone: NEGATIVE  / Bili: Negative / Urobili: 0.2 E.U./dL   Blood: x / Protein: 100 mg/dL / Nitrite: NEGATIVE   Leuk Esterase: Small / RBC: > 10 /HPF / WBC > 10 /HPF   Sq Epi: x / Non Sq Epi: 0-5 /HPF / Bacteria: Present /HPF      CAPILLARY BLOOD GLUCOSE            Consultant(s) Notes Reviewed:  [x ] YES  [ ] NO    MEDICATIONS  (STANDING):  aspirin  chewable 81 milliGRAM(s) Enteral Tube daily  atorvastatin 80 milliGRAM(s) Oral every 24 hours  chlorhexidine 2% Cloths 1 Application(s) Topical <User Schedule>  enoxaparin Injectable 40 milliGRAM(s) SubCutaneous every 24 hours  norepinephrine Infusion 0.05 MICROgram(s)/kG/Min (6.97 mL/Hr) IV Continuous <Continuous>  pantoprazole    Tablet 40 milliGRAM(s) Oral every 24 hours  piperacillin/tazobactam IVPB.. 3.375 Gram(s) IV Intermittent every 8 hours  sertraline 50 milliGRAM(s) Oral every 24 hours    MEDICATIONS  (PRN):      Care Discussed with Consultants/Other Providers [ x] YES  [ ] NO    RADIOLOGY & ADDITIONAL TESTS: ***Transfer MICU to [facility]***    HOSPITAL COURSE: Patient is a 82 y/o M HLD, BPH requiring chronic magdaleno, diverticulitis, anxiety, depression with recent admission to [facility] for stroke (39 day admission, c/b multiple intubations, dysphagia and PEG tube placement, d/c to nursing home in stable condition) now presenting with AMS and hypotension found to be septic 2/2 UTI requiring levophed. Per son, pt was improving with good PO intake, with mental status improving until night of 10/8 when pt was found to be nonverbal with poor PO intake, subsequently found to have BP of 79/58 at nursing home and EMS was called. On admission to the ED: UA positive, magdaleno was exchanged, given 2g CTX, and pt admitted to MICU for pressor requirements. Encephalopathy was attributed to UTI, for which pt currently being managed with Zosyn iso previous prolonged hospitalization and chronic magdaleno. Levophed was weaned off. Mental status improving since admission, now able to converse in short sentences, baseline s/p stroke per son. Formal s/s evaluation: recs PEG feeds as primary nutrition with ice chips for oral hydration and swallowing practice. Pt stable for RMF.       INTERVAL HPI/OVERNIGHT EVENTS: ADÁN    Subjective: Patient seen and examined at bedside. Currently denies pain, CP, SOB, N/V, F/C.     ICU Vital Signs Last 24 Hrs  T(C): 38.1 (09 Oct 2022 00:55), Max: 38.1 (09 Oct 2022 00:55)  T(F): 100.6 (09 Oct 2022 00:55), Max: 100.6 (09 Oct 2022 00:55)  HR: 74 (09 Oct 2022 06:00) (60 - 93)  BP: 108/61 (09 Oct 2022 06:00) (77/51 - 111/70)  BP(mean): 76 (09 Oct 2022 06:00) (64 - 86)  ABP: --  ABP(mean): --  RR: 14 (09 Oct 2022 06:00) (12 - 18)  SpO2: 95% (09 Oct 2022 06:00) (94% - 99%)    O2 Parameters below as of 09 Oct 2022 06:00  Patient On (Oxygen Delivery Method): room air          I&O's Summary    08 Oct 2022 07:01  -  09 Oct 2022 06:46  --------------------------------------------------------  IN: 65.5 mL / OUT: 355 mL / NET: -289.5 mL      PHYSICAL EXAM:  Constitutional: lethargic elderly man, NAD on room air, able to communicate in short 2-3 word sentences   HEENT: PER, anicteric sclera; no oropharyngeal erythema or exudates; MMM  Neck: supple, no appreciable JVD  Respiratory: CTA B/L, no W/R/R  Cardiovascular: +S1/S2, RRR  Gastrointestinal: abdomen soft, NT/ND  Extremities: WWP; no clubbing, cyanosis or edema  Vascular: 2+ radial, femoral, and DP/PT pulses B/L  Dermatologic: skin normal color and turgor; no visible rashes  Neurological: AOx3, withdraws to noxious stimuli     LABS:                        9.6    52.17 )-----------( 213      ( 09 Oct 2022 05:04 )             30.3     10-    141  |  108  |  22  ----------------------------<  187<H>  4.4   |  18<L>  |  1.03    Ca    8.3<L>      09 Oct 2022 05:04  Phos  4.1     10-09  Mg     1.8     10-    TPro  5.6<L>  /  Alb  3.2<L>  /  TBili  0.3  /  DBili  x   /  AST  74<H>  /  ALT  78<H>  /  AlkPhos  119  10-09    PT/INR - ( 09 Oct 2022 00:11 )   PT: 15.5 sec;   INR: 1.30          PTT - ( 09 Oct 2022 00:11 )  PTT:34.4 sec  Urinalysis Basic - ( 09 Oct 2022 02:02 )    Color: Red / Appearance: Cloudy / S.020 / pH: x  Gluc: x / Ketone: NEGATIVE  / Bili: Negative / Urobili: 0.2 E.U./dL   Blood: x / Protein: 100 mg/dL / Nitrite: NEGATIVE   Leuk Esterase: Small / RBC: > 10 /HPF / WBC > 10 /HPF   Sq Epi: x / Non Sq Epi: 0-5 /HPF / Bacteria: Present /HPF      CAPILLARY BLOOD GLUCOSE            Consultant(s) Notes Reviewed:  [x ] YES  [ ] NO    MEDICATIONS  (STANDING):  aspirin  chewable 81 milliGRAM(s) Enteral Tube daily  atorvastatin 80 milliGRAM(s) Oral every 24 hours  chlorhexidine 2% Cloths 1 Application(s) Topical <User Schedule>  enoxaparin Injectable 40 milliGRAM(s) SubCutaneous every 24 hours  norepinephrine Infusion 0.05 MICROgram(s)/kG/Min (6.97 mL/Hr) IV Continuous <Continuous>  pantoprazole    Tablet 40 milliGRAM(s) Oral every 24 hours  piperacillin/tazobactam IVPB.. 3.375 Gram(s) IV Intermittent every 8 hours  sertraline 50 milliGRAM(s) Oral every 24 hours    MEDICATIONS  (PRN):      Care Discussed with Consultants/Other Providers [ x] YES  [ ] NO    RADIOLOGY & ADDITIONAL TESTS:

## 2022-10-10 NOTE — SWALLOW BEDSIDE ASSESSMENT ADULT - SLP PERTINENT HISTORY OF CURRENT PROBLEM
82 y/o M HLD, BPH diverticulitis, anxiety, depression with recent admission to [facility] for stroke now presenting with AMS and hypotension found to be septic 2/2 aspiration pna vs uti requiring levophed.

## 2022-10-10 NOTE — SWALLOW BEDSIDE ASSESSMENT ADULT - SWALLOW EVAL: DIAGNOSIS
Safety of PO diet is unclear at this time. Pt appears to be at high risk of (silent) aspiration d/t reduced safety awareness, dependency on others for feeding, & weak cough.

## 2022-10-10 NOTE — SWALLOW BEDSIDE ASSESSMENT ADULT - COMMENTS
Received in bed, awake & responds to simple questions but poorly engaged. Pvt aide at bedside but only knows Pt x1 day.

## 2022-10-10 NOTE — PROGRESS NOTE ADULT - PROBLEM SELECTOR PLAN 5
CT abdomen/pelvis on 10/9/2022 showed an enlarged prostate. Gutiérrez catheter within the bladder lumen. Bladder wall thickening due to muscular hypertrophy versus cystitis. Large rectal stool. No bowel obstruction. Gutiérrez catheter exchanged in ED. UA revealed + Jenny, WBC, and bacteria. Urine cx neg growth to date.   - Continue to hold tamsulosin and finasteride for now   - Consider ToV on floors

## 2022-10-10 NOTE — PROGRESS NOTE ADULT - ASSESSMENT
Patient is a 82 y/o M HLD, BPH diverticulitis, anxiety, depression with recent admission to [facility] for stroke now presenting with AMS and hypotension found to be septic 2/2 aspiration pna vs uti requiring levophed.     Neuro  Per son, baseline 2 days pt was able to converse. prior to stroke was A&Ox3 caring for wife but since stroke has required assistance    #Metabolic encephalopathy:  2/2 UTI. CTH negative for intracranial hemorrhage or new acute stroke. Upon arrival - will nod head and respond to sternal rub however A&Ox0.   - see ID management below  -Not sedated     #CVA: recent discharge for CVA.   - c/w ASA  - c/w atorvastatin    #Anxiety  - c/w sertraline via PEG  - hold baclofen    Pulmonary:  Currently protecting his airway however high risk for intubation given hx of 4 prior intubations since August 2022    #Aspiration PNA   Bedside US revealed RML and LLL consolidation and with hx of 4 prior intubations since August 2022. Was improving on last admission and able to tolerate PO diet, however with new AMS, has not been able to intake PO  -C/w vanc/zosyn   -Fu sputum, blood, urine bx   -F/u formal s/s evaluation   -Elevate head of bed       CVS  #Hypotension: presenting hypotensive with SBPs 80s non responsive to 2L NS, started on low dose levo to maintain MAP > 65. Lactate 4.1-->4.3  - c/w levo, wean as tolerated  - monitor UO     GI: PEG in place 2/2 dysphagia from CVA. Placed during hospitalization last month.   - keep NPO for now, restart tube feeds when airway protection more reasonably ensured    #Transaminitis  AST/ALT in 60s on discharge to [facility], now slightly worse, likely 2/2 shock. improving   - serial CMPs    #Enlarged gallbladder  Seen on CT, bedside US revealed no stones, with some sludge visualized   -F/u formal RUQ US     Endo: no acute issues    Renal  #BULL: Baseline Cr 09/2022 0.80, now Cr 1.19 likely 2/2 sepsis with possible ATN from hypotension. S/p 2L NS with minimal urine output  - will POCUS for cardiac function to assess ability to give more fluid  - urine lytes  - monitor UO, continue to trend      #BPH: chronic magdaleno for urinary retention, exchanged in ED.  - c/w magdaleno for UOP monitoring  - hold tamsulosin and finasteride, continue when off pressors and tolerating PO      ID:  #septic shock 2/2 aspiration PNA. Leukocytosis 37 with neutrophil predominance, lactate 4.1-->4.3 after 2L NS. S/p zosyn & vanc in ED. Given recent hospitalized would continue to cover with broad spectrum abx. Minimal UO. Recently MRSA+ on previous hospitalization. UA mildly positive   - c/w vanc/zosyn  - f/u urine culture  - f/u blood culture  - monitor UO    F: s/p 2L NS  E: replete PRN  N: NPO, restart tube feeds as tolerated  DVTppx: lovenox  Lines: 3 peripheral IVs   Code status: DNR with cardioversion if necessary, trial intubation  Dispo: MICU  Patient is a 80 y/o M HLD, BPH diverticulitis, anxiety, depression with recent admission to [facility] for stroke now presenting with AMS and hypotension found to be septic 2/2 aspiration pna vs uti admitted to MICU for pressors requirements, now weaned off levophed and with improving mental status from baseline.     Neuro  Per son, baseline 2 days pt was able to converse. prior to stroke was A&Ox3 caring for wife but since stroke has required assistance    #Metabolic encephalopathy:  2/2 UTI. CTH negative for intracranial hemorrhage or new acute stroke. Upon arrival - will nod head and respond to sternal rub however A&Ox0.   - see ID management below  -Not sedated     #CVA: recent discharge for CVA.   - c/w ASA  - c/w atorvastatin    #Anxiety  - c/w sertraline via PEG  - hold baclofen    Pulmonary:  Currently protecting his airway however high risk for intubation given hx of 4 prior intubations since August 2022    #Aspiration PNA   Bedside US revealed RML and LLL consolidation and with hx of 4 prior intubations since August 2022. Was improving on last admission and able to tolerate PO diet, however with new AMS, has not been able to intake PO  -D/c vanc   -C/w zosyn (D1: 10/9)   -Fu sputum, blood, urine bx - negative to date   -S/s recommends PEG feeds with oral hydration   -Elevate head of bed       CVS  ADÁN    GI: PEG in place 2/2 dysphagia from CVA. Placed during hospitalization last month.   -S/s recommends PEG feeds with oral hydration     #Transaminitis  AST/ALT in 60s on discharge to [facility], now slightly worse, likely 2/2 shock. improving   - serial CMPs    #Enlarged gallbladder  Seen on CT, bedside US revealed no stones, with some sludge visualized   -F/u formal RUQ US     Endo: no acute issues    Renal  #BULL: Baseline Cr 09/2022 0.80, now Cr 1.19 likely 2/2 sepsis with possible ATN from hypotension. S/p 2L NS with minimal urine output  - will POCUS for cardiac function to assess ability to give more fluid  - urine lytes  - monitor UO, continue to trend      #BPH: chronic magdaleno for urinary retention, exchanged in ED.  - c/w magdaleno for UOP monitoring  - hold tamsulosin and finasteride, restart as tolerated    ID:  #septic shock 2/2 aspiration PNA. Leukocytosis 37 with neutrophil predominance, lactate 4.1-->4.3 after 2L NS. S/p zosyn & vanc in ED. Given recent hospitalized would continue to cover with broad spectrum abx. Minimal UO. Recently MRSA+ on previous hospitalization. UA mildly positive   - d/c vanc   -C/w zosyn   - f/u urine culture  - f/u blood culture  - monitor UO    F: s/p 2L NS  E: replete PRN  N: PEG  DVTppx: lovenox  Lines: 3 peripheral IVs   Code status: DNR with cardioversion if necessary, trial intubation  Dispo: MICU

## 2022-10-10 NOTE — SWALLOW BEDSIDE ASSESSMENT ADULT - SLP GENERAL OBSERVATIONS
Hypophonic and mildly wet voice at baseline. Pt is Ox self, place (when given choices). Said year was 1941 and President was Fermin Ortez. Able to make basic wants/needs known when asked.

## 2022-10-10 NOTE — PROGRESS NOTE ADULT - SUBJECTIVE AND OBJECTIVE BOX
CC: Patient is a 81y old  Male who presents with a chief complaint of AMS 2/2 aspiration PNA vs UTI     ***Transfer MICU to [facility]***    INTERVAL HPI/OVERNIGHT EVENTS: Transferred from MICU to [facility]. Pt was treated with Levophed, vanc, and zosyn in MICU. Was found to have dec pressor requirements and has been mentating better since admission. CTH no ICH, speech and swallow to consult pt today, urine cxs and blood cxs NGTD. Pt minimally conversive at bedside.     Subjective: Patient seen and examined at bedside. Unable to verbalize symptoms however can nod yes or no when asked ROS. Currently denies CP, SOB, N/V, F/C. Says he is not currently in pain.     ROS: negative unless otherwise stated above.    VITAL SIGNS:  Vital Signs Last 24 Hrs  T(C): 37.3 (10 Oct 2022 09:50), Max: 37.3 (10 Oct 2022 09:50)  T(F): 99.1 (10 Oct 2022 09:50), Max: 99.1 (10 Oct 2022 09:50)  HR: 84 (10 Oct 2022 13:00) (67 - 84)  BP: 143/76 (10 Oct 2022 13:00) (85/51 - 143/76)  BP(mean): 105 (10 Oct 2022 13:00) (63 - 105)  RR: 16 (10 Oct 2022 13:00) (14 - 20)  SpO2: 95% (10 Oct 2022 13:00) (93% - 96%)    Parameters below as of 10 Oct 2022 14:00  Patient On (Oxygen Delivery Method): room air          10-09-22 @ 07:  -  10-10-22 @ 07:00  --------------------------------------------------------  IN: 1194.3 mL / OUT: 1265 mL / NET: -70.7 mL    10-10-22 @ 07:01  -  10-10-22 @ 13:52  --------------------------------------------------------  IN: 200 mL / OUT: 245 mL / NET: -45 mL        PHYSICAL EXAM:    General: NAD  HEENT: MMM  Neck: supple  Cardiovascular: +S1/S2; RRR  Respiratory: CTA B/L; no W/R/R  Gastrointestinal: soft, NT/ND  Extremities: WWP; no edema, clubbing or cyanosis  Vascular: 2+ radial, DP/PT pulses B/L  Neurological: AAOx3; no focal deficits    MEDICATIONS:  MEDICATIONS  (STANDING):  aspirin  chewable 81 milliGRAM(s) Enteral Tube daily  atorvastatin 80 milliGRAM(s) Oral every 24 hours  chlorhexidine 2% Cloths 1 Application(s) Topical <User Schedule>  enoxaparin Injectable 40 milliGRAM(s) SubCutaneous every 24 hours  norepinephrine Infusion 0.05 MICROgram(s)/kG/Min (6.97 mL/Hr) IV Continuous <Continuous>  piperacillin/tazobactam IVPB.. 3.375 Gram(s) IV Intermittent every 8 hours  sertraline 50 milliGRAM(s) Oral every 24 hours    MEDICATIONS  (PRN):      ALLERGIES:  Allergies    No Known Allergies    Intolerances        LABS:                        7.9    38.99 )-----------( 166      ( 10 Oct 2022 05:18 )             24.2     10-10    141  |  108  |  20  ----------------------------<  101<H>  3.4<L>   |  23  |  0.88    Ca    8.5      10 Oct 2022 05:18  Phos  2.6     10-10  Mg     2.0     10-10    TPro  5.3<L>  /  Alb  2.7<L>  /  TBili  0.4  /  DBili  x   /  AST  26  /  ALT  44  /  AlkPhos  108  10-10    PT/INR - ( 09 Oct 2022 00:11 )   PT: 15.5 sec;   INR: 1.30          PTT - ( 09 Oct 2022 00:11 )  PTT:34.4 sec  Urinalysis Basic - ( 09 Oct 2022 02:02 )    Color: Red / Appearance: Cloudy / S.020 / pH: x  Gluc: x / Ketone: NEGATIVE  / Bili: Negative / Urobili: 0.2 E.U./dL   Blood: x / Protein: 100 mg/dL / Nitrite: NEGATIVE   Leuk Esterase: Small / RBC: > 10 /HPF / WBC > 10 /HPF   Sq Epi: x / Non Sq Epi: 0-5 /HPF / Bacteria: Present /HPF      CAPILLARY BLOOD GLUCOSE      POCT Blood Glucose.: 102 mg/dL (10 Oct 2022 05:09)      RADIOLOGY & ADDITIONAL TESTS: Reviewed.

## 2022-10-11 ENCOUNTER — TRANSCRIPTION ENCOUNTER (OUTPATIENT)
Age: 82
End: 2022-10-11

## 2022-10-11 LAB
ALBUMIN SERPL ELPH-MCNC: 3.2 G/DL — LOW (ref 3.3–5)
ALP SERPL-CCNC: 127 U/L — HIGH (ref 40–120)
ALT FLD-CCNC: 56 U/L — HIGH (ref 10–45)
ANION GAP SERPL CALC-SCNC: 12 MMOL/L — SIGNIFICANT CHANGE UP (ref 5–17)
AST SERPL-CCNC: 37 U/L — SIGNIFICANT CHANGE UP (ref 10–40)
BILIRUB SERPL-MCNC: 0.4 MG/DL — SIGNIFICANT CHANGE UP (ref 0.2–1.2)
BUN SERPL-MCNC: 17 MG/DL — SIGNIFICANT CHANGE UP (ref 7–23)
CALCIUM SERPL-MCNC: 8.6 MG/DL — SIGNIFICANT CHANGE UP (ref 8.4–10.5)
CHLORIDE SERPL-SCNC: 105 MMOL/L — SIGNIFICANT CHANGE UP (ref 96–108)
CO2 SERPL-SCNC: 22 MMOL/L — SIGNIFICANT CHANGE UP (ref 22–31)
CREAT SERPL-MCNC: 0.81 MG/DL — SIGNIFICANT CHANGE UP (ref 0.5–1.3)
EGFR: 90 ML/MIN/1.73M2 — SIGNIFICANT CHANGE UP
GLUCOSE SERPL-MCNC: 103 MG/DL — HIGH (ref 70–99)
HCT VFR BLD CALC: 28.1 % — LOW (ref 39–50)
HGB BLD-MCNC: 8.8 G/DL — LOW (ref 13–17)
MAGNESIUM SERPL-MCNC: 1.9 MG/DL — SIGNIFICANT CHANGE UP (ref 1.6–2.6)
MCHC RBC-ENTMCNC: 30 PG — SIGNIFICANT CHANGE UP (ref 27–34)
MCHC RBC-ENTMCNC: 31.3 GM/DL — LOW (ref 32–36)
MCV RBC AUTO: 95.9 FL — SIGNIFICANT CHANGE UP (ref 80–100)
NRBC # BLD: 0 /100 WBCS — SIGNIFICANT CHANGE UP (ref 0–0)
PHOSPHATE SERPL-MCNC: 2.6 MG/DL — SIGNIFICANT CHANGE UP (ref 2.5–4.5)
PLATELET # BLD AUTO: 188 K/UL — SIGNIFICANT CHANGE UP (ref 150–400)
POTASSIUM SERPL-MCNC: 3.4 MMOL/L — LOW (ref 3.5–5.3)
POTASSIUM SERPL-SCNC: 3.4 MMOL/L — LOW (ref 3.5–5.3)
PROT SERPL-MCNC: 6 G/DL — SIGNIFICANT CHANGE UP (ref 6–8.3)
RBC # BLD: 2.93 M/UL — LOW (ref 4.2–5.8)
RBC # FLD: 15.9 % — HIGH (ref 10.3–14.5)
SODIUM SERPL-SCNC: 139 MMOL/L — SIGNIFICANT CHANGE UP (ref 135–145)
WBC # BLD: 26.27 K/UL — HIGH (ref 3.8–10.5)
WBC # FLD AUTO: 26.27 K/UL — HIGH (ref 3.8–10.5)

## 2022-10-11 PROCEDURE — 99233 SBSQ HOSP IP/OBS HIGH 50: CPT | Mod: GC

## 2022-10-11 RX ORDER — ASPIRIN/CALCIUM CARB/MAGNESIUM 324 MG
81 TABLET ORAL DAILY
Refills: 0 | Status: DISCONTINUED | OUTPATIENT
Start: 2022-10-11 | End: 2022-10-15

## 2022-10-11 RX ORDER — SERTRALINE 25 MG/1
50 TABLET, FILM COATED ORAL EVERY 24 HOURS
Refills: 0 | Status: DISCONTINUED | OUTPATIENT
Start: 2022-10-11 | End: 2022-10-15

## 2022-10-11 RX ORDER — FINASTERIDE 5 MG/1
5 TABLET, FILM COATED ORAL DAILY
Refills: 0 | Status: DISCONTINUED | OUTPATIENT
Start: 2022-10-11 | End: 2022-10-15

## 2022-10-11 RX ORDER — POTASSIUM PHOSPHATE, MONOBASIC POTASSIUM PHOSPHATE, DIBASIC 236; 224 MG/ML; MG/ML
15 INJECTION, SOLUTION INTRAVENOUS ONCE
Refills: 0 | Status: COMPLETED | OUTPATIENT
Start: 2022-10-11 | End: 2022-10-11

## 2022-10-11 RX ORDER — ATORVASTATIN CALCIUM 80 MG/1
80 TABLET, FILM COATED ORAL EVERY 24 HOURS
Refills: 0 | Status: DISCONTINUED | OUTPATIENT
Start: 2022-10-11 | End: 2022-10-15

## 2022-10-11 RX ORDER — TAMSULOSIN HYDROCHLORIDE 0.4 MG/1
0.4 CAPSULE ORAL AT BEDTIME
Refills: 0 | Status: DISCONTINUED | OUTPATIENT
Start: 2022-10-11 | End: 2022-10-15

## 2022-10-11 RX ADMIN — FINASTERIDE 5 MILLIGRAM(S): 5 TABLET, FILM COATED ORAL at 11:54

## 2022-10-11 RX ADMIN — PIPERACILLIN AND TAZOBACTAM 25 GRAM(S): 4; .5 INJECTION, POWDER, LYOPHILIZED, FOR SOLUTION INTRAVENOUS at 09:01

## 2022-10-11 RX ADMIN — PIPERACILLIN AND TAZOBACTAM 25 GRAM(S): 4; .5 INJECTION, POWDER, LYOPHILIZED, FOR SOLUTION INTRAVENOUS at 00:49

## 2022-10-11 RX ADMIN — PANTOPRAZOLE SODIUM 40 MILLIGRAM(S): 20 TABLET, DELAYED RELEASE ORAL at 11:55

## 2022-10-11 RX ADMIN — POTASSIUM PHOSPHATE, MONOBASIC POTASSIUM PHOSPHATE, DIBASIC 62.5 MILLIMOLE(S): 236; 224 INJECTION, SOLUTION INTRAVENOUS at 12:29

## 2022-10-11 RX ADMIN — Medication 81 MILLIGRAM(S): at 11:54

## 2022-10-11 RX ADMIN — TAMSULOSIN HYDROCHLORIDE 0.4 MILLIGRAM(S): 0.4 CAPSULE ORAL at 11:54

## 2022-10-11 RX ADMIN — ENOXAPARIN SODIUM 40 MILLIGRAM(S): 100 INJECTION SUBCUTANEOUS at 09:01

## 2022-10-11 RX ADMIN — SERTRALINE 50 MILLIGRAM(S): 25 TABLET, FILM COATED ORAL at 11:54

## 2022-10-11 RX ADMIN — ATORVASTATIN CALCIUM 80 MILLIGRAM(S): 80 TABLET, FILM COATED ORAL at 22:34

## 2022-10-11 RX ADMIN — PIPERACILLIN AND TAZOBACTAM 25 GRAM(S): 4; .5 INJECTION, POWDER, LYOPHILIZED, FOR SOLUTION INTRAVENOUS at 17:19

## 2022-10-11 RX ADMIN — CHLORHEXIDINE GLUCONATE 1 APPLICATION(S): 213 SOLUTION TOPICAL at 06:28

## 2022-10-11 RX ADMIN — TAMSULOSIN HYDROCHLORIDE 0.4 MILLIGRAM(S): 0.4 CAPSULE ORAL at 22:34

## 2022-10-11 NOTE — PHYSICAL THERAPY INITIAL EVALUATION ADULT - PERTINENT HX OF CURRENT PROBLEM, REHAB EVAL
81 M with PMHx anxiety, HTN, BPH, diverticulitis, recent ischemic CVA s/p 39d admission at [facility] from [facility] due to concern for UTI.  Initially presented to [facility] on 8/12 for blurry vision, found to have bithalamic ischemic stroke on MRI. His course was complicated by an in hospital fall then subsequent unresponsiveness requiring intubation and MICU care. He had 3 failed attempts at extubation first complicated by poor secretion management (8/16) then by stridor and airway edema (8/19). Extubated 8/24 and weaned down to NC, but then could not tolerate HFNC and was reintubated; vanc/cef/flagyl were given for possible HAP (per son was MRSA but unknown if this was sputum culture or nare PCR). Extubated on 8/31 then transferred to floors, weaned to RA by 9/12.  Patient suffered from persistent dysphagia requiring PEG insertion which was done on 9/8, s/p delirious PEG removal on 9/14 with successful reinsertion. TOV intermittently failing requiring discharge with magdaleno catheter. Also diagnosed with Ileus which resolved with disimpaction and bowel regimen.    Discharged to [facility] on 9/21, vitals prior to discharge notable for HR 70s-80s BP 120s-130s/70s.  At rehab was starting to ambulate and take PO with supplemental PEG feeds. He was interactive and improving until morning of 10/8 where he was noted to be nonverbal and would not take PO. Vital signs at [facility] notable for BP 79/58 while supine, no temporal fever. Magdaleno was exchanged there and patient given 2g ceftriaxone and 200cc NS. BP did not respond so they notified EMS, who arrived and started norepinephrine.

## 2022-10-11 NOTE — CONSULT NOTE ADULT - ASSESSMENT
per Internal Medicine     81 y o M HLD, BPH diverticulitis, anxiety, depression with recent admission to [facility] for stroke now presenting with AMS and hypotension found to be septic 2/2 aspiration pna vs uti admitted to MICU for pressors requirements, now weaned off levophed and with improving mental status from baseline.     Neuro  Per son, baseline 2 days pt was able to converse. prior to stroke was A&Ox3 caring for wife but since stroke has required assistance    #Metabolic encephalopathy:  2/2 UTI. CTH negative for intracranial hemorrhage or new acute stroke. Upon arrival - will nod head and respond to sternal rub however A&Ox0.   - see ID management below  -Not sedated     #CVA: recent discharge for CVA.   - c/w ASA  - c/w atorvastatin    #Anxiety  - c/w sertraline via PEG  - hold baclofen    Pulmonary:  Currently protecting his airway however high risk for intubation given hx of 4 prior intubations since August 2022    #Aspiration PNA   Bedside US revealed RML and LLL consolidation and with hx of 4 prior intubations since August 2022. Was improving on last admission and able to tolerate PO diet, however with new AMS, has not been able to intake PO  -D/c vanc   -C/w zosyn (D1: 10/9)   -Fu sputum, blood, urine bx - negative to date   -S/s recommends PEG feeds with oral hydration   -Elevate head of bed       CVS  ADÁN    GI: PEG in place 2/2 dysphagia from CVA. Placed during hospitalization last month.   -S/s recommends PEG feeds with oral hydration     #Transaminitis  AST/ALT in 60s on discharge to [facility], now slightly worse, likely 2/2 shock. improving   - serial CMPs    #Enlarged gallbladder  Seen on CT, bedside US revealed no stones, with some sludge visualized   -F/u formal RUQ US     Endo: no acute issues    Renal  #BULL: Baseline Cr 09/2022 0.80, now Cr 1.19 likely 2/2 sepsis with possible ATN from hypotension. S/p 2L NS with minimal urine output  - will POCUS for cardiac function to assess ability to give more fluid  - urine lytes  - monitor UO, continue to trend      #BPH: chronic magdaleno for urinary retention, exchanged in ED.  - c/w magdaleno for UOP monitoring  - hold tamsulosin and finasteride, restart as tolerated    ID:  #septic shock 2/2 aspiration PNA. Leukocytosis 37 with neutrophil predominance, lactate 4.1-->4.3 after 2L NS. S/p zosyn & vanc in ED. Given recent hospitalized would continue to cover with broad spectrum abx. Minimal UO. Recently MRSA+ on previous hospitalization. UA mildly positive   - d/c vanc   -C/w zosyn   - f/u urine culture  - f/u blood culture  - monitor UO    F: s/p 2L NS  E: replete PRN  N: PEG  DVTppx: lovenox  Lines: 3 peripheral IVs   Code status: DNR with cardioversion if necessary, trial intubation  Dispo: MICU   
81 M with HTN, BPH with chronic magdaleno, recent CVA presents with septic shock 2/2 UTI. MICU consulted for admission.    Would recommend:  See H&P for more detailed plan.    #Septic shock 2/2 UTI  Given recent admission, reasonable to administer broad spectrum abx. S/p 2g ceftriaxone at nursing home prior to cultures.  - c/w vanc zosyn  - f/u blood and urine cultures  - peripheral norepinephrine to target MAP>65; central line and a-line if worsening shock    #metabolic encephalopathy  acute from septic shock on chronic post-CVA  - keep NPO for now, restart tube feeds when airway protection more reasonably ensured    #BPH  magdaleno exchanged in ED  - c/w magdaleno for UOP monitoring  - hold tamsulosin and finasteride, continue when off pressors and tolerating PO    #Anxiety  - c/w sertraline via PEG  - hold baclofen    #CVA  - c/w atorvastatin    #Dysphagia  - NPO for now, anticipate restarting tube feeds as encephalopath improves    Code status: DNR, would want trial of intubation  Dispo: MICU  d/w night intensivist

## 2022-10-11 NOTE — PROGRESS NOTE ADULT - SUBJECTIVE AND OBJECTIVE BOX
CC: Patient is a 81y old  Male who presents with a chief complaint of AMS 2/2 aspiration PNA vs UTI     INTERVAL HPI/OVERNIGHT EVENTS: [facility]red [facility] MICU to [facility]. Pt was treated with Levophed, vanc, and zosyn in MICU. Was found to have dec pressor requirements and has been mentating better since admission. CTH no ICH, speech and swallow to consult pt today, urine cxs and blood cxs NGTD. Pt minimally conversive at bedside.     Subjective: Patient seen and examined at bedside. Unable to verbalize symptoms however can nod yes or no when asked ROS. Currently denies CP, SOB, N/V, F/C. Says he is not currently in pain.     ROS: negative unless otherwise stated above.    VITAL SIGNS:  Vital Signs Last 24 Hrs  T(C): 37.3 (10 Oct 2022 09:50), Max: 37.3 (10 Oct 2022 09:50)  T(F): 99.1 (10 Oct 2022 09:50), Max: 99.1 (10 Oct 2022 09:50)  HR: 84 (10 Oct 2022 13:00) (67 - 84)  BP: 143/76 (10 Oct 2022 13:00) (85/51 - 143/76)  BP(mean): 105 (10 Oct 2022 13:00) (63 - 105)  RR: 16 (10 Oct 2022 13:00) (14 - 20)  SpO2: 95% (10 Oct 2022 13:00) (93% - 96%)    Parameters below as of 10 Oct 2022 14:00  Patient On (Oxygen Delivery Method): room air          10-09-22 @ 07:  -  10-10-22 @ 07:00  --------------------------------------------------------  IN: 1194.3 mL / OUT: 1265 mL / NET: -70.7 mL    10-10-22 @ 07:01  -  10-10-22 @ 13:52  --------------------------------------------------------  IN: 200 mL / OUT: 245 mL / NET: -45 mL        PHYSICAL EXAM:    General: NAD  HEENT: MMM  Neck: supple  Cardiovascular: +S1/S2; RRR  Respiratory: CTA B/L; no W/R/R  Gastrointestinal: soft, NT/ND  Extremities: WWP; no edema, clubbing or cyanosis  Vascular: 2+ radial, DP/PT pulses B/L  Neurological: AAOx3; no focal deficits    MEDICATIONS:  MEDICATIONS  (STANDING):  aspirin  chewable 81 milliGRAM(s) Enteral Tube daily  atorvastatin 80 milliGRAM(s) Oral every 24 hours  chlorhexidine 2% Cloths 1 Application(s) Topical <User Schedule>  enoxaparin Injectable 40 milliGRAM(s) SubCutaneous every 24 hours  norepinephrine Infusion 0.05 MICROgram(s)/kG/Min (6.97 mL/Hr) IV Continuous <Continuous>  piperacillin/tazobactam IVPB.. 3.375 Gram(s) IV Intermittent every 8 hours  sertraline 50 milliGRAM(s) Oral every 24 hours    MEDICATIONS  (PRN):      ALLERGIES:  Allergies    No Known Allergies    Intolerances        LABS:                        7.9    38.99 )-----------( 166      ( 10 Oct 2022 05:18 )             24.2     10-10    141  |  108  |  20  ----------------------------<  101<H>  3.4<L>   |  23  |  0.88    Ca    8.5      10 Oct 2022 05:18  Phos  2.6     10-10  Mg     2.0     10-10    TPro  5.3<L>  /  Alb  2.7<L>  /  TBili  0.4  /  DBili  x   /  AST  26  /  ALT  44  /  AlkPhos  108  10-10    PT/INR - ( 09 Oct 2022 00:11 )   PT: 15.5 sec;   INR: 1.30          PTT - ( 09 Oct 2022 00:11 )  PTT:34.4 sec  Urinalysis Basic - ( 09 Oct 2022 02:02 )    Color: Red / Appearance: Cloudy / S.020 / pH: x  Gluc: x / Ketone: NEGATIVE  / Bili: Negative / Urobili: 0.2 E.U./dL   Blood: x / Protein: 100 mg/dL / Nitrite: NEGATIVE   Leuk Esterase: Small / RBC: > 10 /HPF / WBC > 10 /HPF   Sq Epi: x / Non Sq Epi: 0-5 /HPF / Bacteria: Present /HPF      CAPILLARY BLOOD GLUCOSE      POCT Blood Glucose.: 102 mg/dL (10 Oct 2022 05:09)      RADIOLOGY & ADDITIONAL TESTS: Reviewed. CC: Patient is a 81y old  Male who presents with a chief complaint of AMS 2/2 aspiration PNA vs UTI     INTERVAL HPI/OVERNIGHT EVENTS: Pt slowly conversive, but more receptive to questions today. Asking if he may take off his SCDs, otherwise denies ROS.     Subjective: Patient seen and examined at bedside. Says his ankles hurt from SCDs, however verbalizes he is feeling better.     ROS: negative unless otherwise stated above.    VITAL SIGNS:  Vital Signs Last 24 Hrs  T(C): 37.3 (10 Oct 2022 09:50), Max: 37.3 (10 Oct 2022 09:50)  T(F): 99.1 (10 Oct 2022 09:50), Max: 99.1 (10 Oct 2022 09:50)  HR: 84 (10 Oct 2022 13:00) (67 - 84)  BP: 143/76 (10 Oct 2022 13:00) (85/51 - 143/76)  BP(mean): 105 (10 Oct 2022 13:00) (63 - 105)  RR: 16 (10 Oct 2022 13:00) (14 - 20)  SpO2: 95% (10 Oct 2022 13:00) (93% - 96%)    Parameters below as of 10 Oct 2022 14:00  Patient On (Oxygen Delivery Method): room air          10-09-22 @ 07:  -  10-10-22 @ 07:00  --------------------------------------------------------  IN: 1194.3 mL / OUT: 1265 mL / NET: -70.7 mL    10-10-22 @ 07:01  -  10-10-22 @ 13:52  --------------------------------------------------------  IN: 200 mL / OUT: 245 mL / NET: -45 mL        PHYSICAL EXAM:    General: NAD  HEENT: MMM  Neck: supple  Cardiovascular: +S1/S2; RRR  Respiratory: CTA B/L; no W/R/R  Gastrointestinal: soft, NT/ND  Extremities: WWP; no edema, clubbing or cyanosis  Vascular: 2+ radial, DP/PT pulses B/L  Neurological: AAOx3; no focal deficits    MEDICATIONS:  MEDICATIONS  (STANDING):  aspirin  chewable 81 milliGRAM(s) Enteral Tube daily  atorvastatin 80 milliGRAM(s) Oral every 24 hours  chlorhexidine 2% Cloths 1 Application(s) Topical <User Schedule>  enoxaparin Injectable 40 milliGRAM(s) SubCutaneous every 24 hours  norepinephrine Infusion 0.05 MICROgram(s)/kG/Min (6.97 mL/Hr) IV Continuous <Continuous>  piperacillin/tazobactam IVPB.. 3.375 Gram(s) IV Intermittent every 8 hours  sertraline 50 milliGRAM(s) Oral every 24 hours    MEDICATIONS  (PRN):      ALLERGIES:  Allergies    No Known Allergies    Intolerances        LABS:                        7.9    38.99 )-----------( 166      ( 10 Oct 2022 05:18 )             24.2     10-10    141  |  108  |  20  ----------------------------<  101<H>  3.4<L>   |  23  |  0.88    Ca    8.5      10 Oct 2022 05:18  Phos  2.6     10-10  Mg     2.0     10-10    TPro  5.3<L>  /  Alb  2.7<L>  /  TBili  0.4  /  DBili  x   /  AST  26  /  ALT  44  /  AlkPhos  108  10-10    PT/INR - ( 09 Oct 2022 00:11 )   PT: 15.5 sec;   INR: 1.30          PTT - ( 09 Oct 2022 00:11 )  PTT:34.4 sec  Urinalysis Basic - ( 09 Oct 2022 02:02 )    Color: Red / Appearance: Cloudy / S.020 / pH: x  Gluc: x / Ketone: NEGATIVE  / Bili: Negative / Urobili: 0.2 E.U./dL   Blood: x / Protein: 100 mg/dL / Nitrite: NEGATIVE   Leuk Esterase: Small / RBC: > 10 /HPF / WBC > 10 /HPF   Sq Epi: x / Non Sq Epi: 0-5 /HPF / Bacteria: Present /HPF      CAPILLARY BLOOD GLUCOSE      POCT Blood Glucose.: 102 mg/dL (10 Oct 2022 05:09)      RADIOLOGY & ADDITIONAL TESTS: Reviewed.

## 2022-10-11 NOTE — PROGRESS NOTE ADULT - ATTENDING COMMENTS
80 y/o M HLD, BPH requiring chronic magdaleno, diverticulitis, anxiety, depression with recent admission to [facility] for stroke (39 day admission, c/b multiple intubations, dysphagia and PEG tube placement, d/c to nursing home in stable condition) who presented with septic shock and metabolic encephalopathy likely due to complicated UTI and aspiration PNA    #Septic Shock - due to UTI and PNA. Resolved  #metabolic encephalopathy - due to infections below, resolved  #UTI  #Hospital acquired pneumonia   - c/w Zosyn for complicated UTI due to Magdaleno as well as HAP/aspiration gram negative coverage  - mental status improving    #BPH  - resume finasteride and flomax  - patient with chronic magdaleno but will TOV today, can replace if needed but will trial without now that patient more mobile.  OOBTC, maintain bowel regimen    #CVA  - bithalamic ischemic stroke on MRI  - residual deficits, now returned to baseline  - s/p PEG and patient with dysphagia.  Passed for pureed diet today  - aspiration precautions  - PT consult    #Anemia  - likely AOCD, stable Hgb and no signs of bleeding  - trend Hgb    #Transaminitis  - likely due to sepsis, improved  - RUQ with gallbladder sludge, no signs of obstruction    DVT ppx:  Dispo: PT pending likely DANIEL  Code status: DNR but would trial intubation 82 y/o M HLD, BPH requiring chronic magdaleno, diverticulitis, anxiety, depression with recent admission to [facility] for stroke (39 day admission, c/b multiple intubations, dysphagia and PEG tube placement, d/c to nursing home in stable condition) who presented with septic shock and metabolic encephalopathy likely due to complicated UTI and aspiration PNA    #Septic Shock - due to UTI and PNA. Resolved  #metabolic encephalopathy - due to infections below, resolved  #UTI  #Hospital acquired pneumonia   - c/w Zosyn for complicated UTI due to Magdaleno as well as HAP/aspiration gram negative coverage  - mental status improving    #BPH  - resume finasteride and flomax  - patient with chronic magdaleno but will TOV today, can replace if needed but will trial without now that patient more mobile.  OOBTC, maintain bowel regimen    #CVA  - bithalamic ischemic stroke on MRI  - residual deficits, now returned to baseline  - s/p PEG and patient with dysphagia.  Passed for pureed diet today  - aspiration precautions  - PT consult    #Anemia  - likely AOCD, stable Hgb and no signs of bleeding  - trend Hgb    #Transaminitis  - likely due to sepsis, improved  - RUQ with gallbladder sludge, no signs of obstruction    #Functional quadriplegia  - s/p CVA, PEG  - has HHAs, likely for DANIEL    DVT ppx:  Dispo: PT pending likely DANIEL  Code status: DNR but would trial intubation

## 2022-10-11 NOTE — DISCHARGE NOTE PROVIDER - ATTENDING DISCHARGE PHYSICAL EXAMINATION:
I have read and agree with the resident Discharge Note above.  Patient seen and discussed with resident team on the day of discharge.     Briefly, 80 y/o M HLD, BPH requiring chronic magdaleno, diverticulitis, anxiety, depression with recent admission to [facility] for stroke (39 day admission, c/b multiple intubations, dysphagia and PEG tube placement, d/c to nursing home in stable condition) who presented with septic shock and metabolic encephalopathy likely due to complicated UTI and aspiration PNA    #Septic Shock - due to UTI and PNA. Resolved  #metabolic encephalopathy - due to infections below, resolved  #UTI  #Hospital acquired pneumonia   - c/w Zosyn for complicated UTI due to Magdaleno as well as HAP/aspiration gram negative coverage.  Will cover for 7 days. EOT 10/15  - mental status back to baseline    #BPH  - resume finasteride and flomax  - removed Magdaleno and passed TOV.  OOBTC, maintain bowel regimen    #Hematuria  - likely due to magdaleno, self resolved.  Hgb stable    #CVA  - bithalamic ischemic stroke on MRI  - residual deficits, now returned to baseline  - s/p PEG and patient with dysphagia.  Passed for regular diet from soft and bite sized  - aspiration precautions  - PT consult recommends DANIEL    #Anemia  - likely AOCD, stable Hgb and no signs of bleeding    #Transaminitis  - likely due to sepsis, improved  - RUQ with gallbladder sludge, no signs of obstruction    #Functional quadriplegia  - s/p CVA, PEG    Attending exam on day of discharge:   Gen: NAD, lying in bed   HEENT: NCAT, MMM, clear OP, seborrheic dermatitis of eyebrows  Neck: supple, trachea at midline  CV: RRR, no m/g/r appreciated  Pulm: CTA B, no w/r/r, normal work of breathing  Abd: +BS, soft, NTND, + PEG with site c/d/i  : deferred  Skin: no rashes, ulcers, jaundice; intact, warm and dry  Ext: WWP, no c/c/e  MSK: 3+/5 strength b/l LEs, 4/5 b/l UEs, normal passive range of motion, no swollen or erythematous joints  Neuro: AOx3, responses can be delayed but are appropriate, no change from baseline  Psych: pleasant, conversant and appropriate    I was physically present for the evaluation and management services provided. I agree with the included history, physical, and plan which I reviewed and edited where appropriate. I spent > 30 minutes with the patient and the patient's family on direct patient care and discharge planning with more than 50% of the visit spent on counseling and/or coordination of care.     Wilber Brandon  [phone]

## 2022-10-11 NOTE — PROGRESS NOTE ADULT - PROBLEM SELECTOR PLAN 4
Per H&P, pt initially presented to [facility] on 8/12/22 for blurry vision, found to have bithalamic ischemic stroke on MRI. His course was complicated by an in hospital fall then subsequent unresponsiveness requiring intubation and MICU care with 3 failed attempts at extubation first complicated by poor secretion management (8/16) then by stridor and airway edema (8/19). Extubated 8/24 and weaned down to NC, but then could not tolerate HFNC and was reintubated; vanc/cef/flagyl were given for possible HAP leading to extubation on 8/31, weaned to RA by 9/12. Per notes, patient suffered from persistent dysphagia requiring PEG insertion which was done on 9/8, s/p delirious PEG removal on 9/14 with successful reinsertion. On examination on [facility] on 10/10/2022, PEG site clear and without bleeding or discharge/erythema. CT chest w/ IV contrast on 10/9/2022 show percutaneous gastrostomy tube within the stomach lumen with colonic diverticulosis.   - Pt to be consulted by S&S today, 10/10/2022  - If cleared by S&S, can resume feeds and PO medications Per H&P, pt initially presented to [facility] on 8/12/22 for blurry vision, found to have bithalamic ischemic stroke on MRI. His course was complicated by an in hospital fall then subsequent unresponsiveness requiring intubation and MICU care with 3 failed attempts at extubation first complicated by poor secretion management (8/16) then by stridor and airway edema (8/19). Extubated 8/24 and weaned down to NC, but then could not tolerate HFNC and was reintubated; vanc/cef/flagyl were given for possible HAP leading to extubation on 8/31, weaned to RA by 9/12. Per notes, patient suffered from persistent dysphagia requiring PEG insertion which was done on 9/8, s/p delirious PEG removal on 9/14 with successful reinsertion. On examination on [facility] on 10/11/2022, PEG site clear and without bleeding or discharge/erythema. CT chest w/ IV contrast on 10/9/2022 show percutaneous gastrostomy tube within the stomach lumen with colonic diverticulosis. Speech and swallow labels pt as high aspiration risk, but recommends pureed foods.   - Resume feeds and PO medications with strict aspiration precautions

## 2022-10-11 NOTE — CONSULT NOTE ADULT - SUBJECTIVE AND OBJECTIVE BOX
Patient is a 81y old  Male who presents with a chief complaint of Septic Shock (10 Oct 2022 13:51)        HPI:  81 M with PMHx anxiety, HTN, BPH, diverticulitis, recent ischemic CVA s/p 39d admission at [facility] from [facility] due to concern for UTI.  Initially presented to [facility] on 8/12 for blurry vision, found to have bithalamic ischemic stroke on MRI. His course was complicated by an in hospital fall then subsequent unresponsiveness requiring intubation and MICU care. He had 3 failed attempts at extubation first complicated by poor secretion management (8/16) then by stridor and airway edema (8/19). Extubated 8/24 and weaned down to NC, but then could not tolerate HFNC and was reintubated; vanc/cef/flagyl were given for possible HAP (per son was MRSA but unknown if this was sputum culture or nare PCR). Extubated on 8/31 then transferred to floors, weaned to RA by 9/12.  Patient suffered from persistent dysphagia requiring PEG insertion which was done on 9/8, s/p delirious PEG removal on 9/14 with successful reinsertion. TOV intermittently failing requiring discharge with magdaleno catheter. Also diagnosed with Ileus which resolved with disimpaction and bowel regimen.    Discharged to [facility] on 9/21, vitals prior to discharge notable for HR 70s-80s BP 120s-130s/70s.  At rehab was starting to ambulate and take PO with supplemental PEG feeds. He was interactive and improving until morning of 10/8 where he was noted to be nonverbal and would not take PO. Vital signs at [facility] notable for BP 79/58 while supine, no temporal fever. Magdaleno was exchanged there and patient given 2g ceftriaxone and 200cc NS. BP did not respond so they notified EMS, who arrived and started norepinephrine.      ED course:  Vitals: 100.6F, HR 84, BP 82/52 prompting initiation of levo, RR16 97% RA  Labs: WBC 37 w/ neutrophil predominance, Hb 9.2/Hct 29.1, lactate 4.1, BUN 19/Cr 1.19, Trop 0.04, UA >10 wbcs, Leukocyte esterase +  Imaging: EKG NSR with Qs in V1-2  Interventions: tylenol, vanc x1, zosyn, 2L NS, levophed initiated for MAP<65 (09 Oct 2022 03:05)      PAST MEDICAL & SURGICAL HISTORY:      MEDICATIONS  (STANDING):  aspirin  chewable 81 milliGRAM(s) Oral daily  atorvastatin 80 milliGRAM(s) Oral every 24 hours  chlorhexidine 2% Cloths 1 Application(s) Topical <User Schedule>  enoxaparin Injectable 40 milliGRAM(s) SubCutaneous every 24 hours  pantoprazole  Injectable 40 milliGRAM(s) IV Push every 24 hours  piperacillin/tazobactam IVPB.. 3.375 Gram(s) IV Intermittent every 8 hours  sertraline 50 milliGRAM(s) Oral every 24 hours    MEDICATIONS  (PRN):           FAMILY HISTORY:    CBC Full  -  ( 10 Oct 2022 05:18 )  WBC Count : 38.99 K/uL  RBC Count : 2.60 M/uL  Hemoglobin : 7.9 g/dL  Hematocrit : 24.2 %  Platelet Count - Automated : 166 K/uL  Mean Cell Volume : 93.1 fl  Mean Cell Hemoglobin : 30.4 pg  Mean Cell Hemoglobin Concentration : 32.6 gm/dL  Auto Neutrophil # : 35.29 K/uL  Auto Lymphocyte # : 1.68 K/uL  Auto Monocyte # : 1.01 K/uL  Auto Eosinophil # : 0.35 K/uL  Auto Basophil # : 0.66 K/uL  Auto Neutrophil % : 89.6 %  Auto Lymphocyte % : 4.3 %  Auto Monocyte % : 2.6 %  Auto Eosinophil % : 0.9 %  Auto Basophil % : 1.7 %      10-10    141  |  108  |  20  ----------------------------<  101<H>  3.4<L>   |  23  |  0.88    Ca    8.5      10 Oct 2022 05:18  Phos  2.6     10-10  Mg     2.0     10-10    TPro  5.3<L>  /  Alb  2.7<L>  /  TBili  0.4  /  DBili  x   /  AST  26  /  ALT  44  /  AlkPhos  108  10-10            Radiology :   < from: Xray Chest 1 View- PORTABLE-Urgent (Xray Chest 1 View- PORTABLE-Urgent .) (10.09.22 @ 03:07) >  ACC: [id number] EXAM:  XR CHEST PORTABLE URGENT 1V                          PROCEDURE DATE:  10/09/2022          INTERPRETATION:  Clinical History: Hypertension    Frontal examination of the chest demonstrates the heart to be within   normal limits in transverse diameter. Discoid change and/or infiltrate   left lung base. Degenerative changes thoracic spine. Indication aortic   knob.    IMPRESSION: Discoid change and/or infiltrate left lung base        < from: CT Head No Cont (10.09.22 @ 02:32) >    ACC: [id number] EXAM:  CT BRAIN                          PROCEDURE DATE:  10/09/2022          INTERPRETATION:  Ruben GONZALEZ MD, have reviewed the images and the   report and agree with the findings, with the following modification:    There arebilateral thalamic lacunar infarcts (left greater than right)   of indeterminate age.    There are small retention cyst/polyps within each maxillary sinus.      ******PRELIMINARY REPORT******    INDICATIONS: Altered mental status    TECHNIQUE:  Serial axial images were obtained from the skull base to the   vertex without the use of intravenous contrast. Coronal and sagittal   reformatted images were obtained.    COMPARISON EXAMINATION: None.    FINDINGS:  VENTRICLES AND SULCI: The ventricles and sulci are within normal limits   for the patient's age.  INTRA-AXIAL: No intracranial mass, acute hemorrhage, or midline shift is   present. There is preservation of the gray-white matter differentiation   without evidence of acute transcortical infarction. Bilateral thalamic   lacunar infarctions.  EXTRA-AXIAL: No extra-axial fluid collection is present.  VISUALIZED SINUSES: No air-fluid levels are identified.  VISUALIZED MASTOIDS:  Clear.  CALVARIUM:  Normal.  MISCELLANEOUS:  Post bilateral native ocular lens replacements.      IMPRESSION:  No intracranial hemorrhage or transcortical infarction.        < from: CT Chest w/ IV Cont (10.09.22 @ 02:37) >  ACC: [id number] EXAM:  CT CHEST IC                        ACC: [id number] EXAM:  CT ABDOMEN AND PELVIS IC                          PROCEDURE DATE:  10/09/2022          INTERPRETATION:  CT Chest, Abdomen and Pelvis With Contrast 10/9/2022   2:15 AM    Clinical indication: Sepsis and confusion. Recent CVA.    TECHNIQUE: CT of chest, abdomen and pelvis was performed following the   administration of intravenous contrast. Multiplanar images were reviewed.    COMPARISON: None    FINDINGS:    Lungs: Patchy consolidation opacities are seen in both lower lobes.  Pleural spaces: No significant pleural effusion. No pneumothorax.  Heart: Unremarkable. No cardiomegaly. No pericardial effusion.  Lymph nodes: Unremarkable. No enlarged lymph nodes.  Vasculature: No aortic aneurysm. No pulmonary emboli. Small amount of   fluid within the pericardial recesses around the ascending aorta.    Bones/joints: Unremarkable. No acute fracture.  Soft tissues: Unremarkable.    Liver: Normal. No mass.  Gallbladder andbile ducts: Mildly distended. No calcified stones. No   ductal dilation.  Pancreas: Normal. No ductal dilation.  Spleen: Normal. No splenomegaly.  Adrenal glands: No mass.  Kidneys and ureters: No hydronephrosis or nephrolithiasis. Mild bilateral   perinephric stranding.  Stomach and bowel: Percutaneous gastrostomy tube within the stomach   lumen. Colonic diverticulosis. Large rectal stool burden. No bowel   obstruction.  Appendix: Normal.    Intraperitoneal space: No free air. No ascites.  Vasculature: No abdominal aortic aneurysm.  Lymph nodes: No lymphadenopathy.  Urinary bladder: Bladder wall is diffusely thickened.  Reproductive: Markedly enlarged prostate.  Bones/joints: No acute osseous abnormality. Degenerative changes of   spine. Narrowing of L1-L2 level. Mild anterolisthesis of L4 on L5.  Soft tissues: Small fat containing right inguinal hernia.    IMPRESSION:    Pulmonary opacities in both lower lobes due to atelectasis/pneumonia.    Enlarged prostate. Magdaleno catheter within thebladder lumen. Bladder wall   thickening due to muscular hypertrophy versus cystitis.    Large rectal stool. No bowel obstruction.              Vital Signs Last 24 Hrs  T(C): 36.9 (11 Oct 2022 05:10), Max: 37.4 (10 Oct 2022 14:19)  T(F): 98.4 (11 Oct 2022 05:10), Max: 99.3 (10 Oct 2022 14:19)  HR: 77 (11 Oct 2022 05:10) (71 - 84)  BP: 154/91 (11 Oct 2022 05:10) (114/69 - 154/91)  BP(mean): 96 (10 Oct 2022 14:00) (86 - 105)  RR: 18 (11 Oct 2022 05:10) (16 - 21)  SpO2: 96% (11 Oct 2022 05:10) (92% - 96%)    Parameters below as of 11 Oct 2022 05:10  Patient On (Oxygen Delivery Method): room air            REVIEW OF SYSTEMS:  per HPI         Physical Exam:  81 y o man lying comfortably in semi Roldan's position , somnolent       Neurologic Exam:         Motor Exam:      poor effort secondary to somnolence     Right UE:               no focal weakness ,  > 3+/5 throughout                                   Left UE:                 no focal weakness,  > 3+/5 throughout           Right LE:                no focal weakness,  > 3+/5 throughout        Left LE:                  no focal weakness,  > 3+/5 throughout           Sensation:         intact to pinch xtremities                        DTR :                     biceps/brachioradialis : equal                                              patella/ankle : equal                                                                               neg Babinski       Gait :  not tested        PM&R Impression :          Recommendations / Plan :     1) Physical / Occupational therapy focusing on therapeutic exercises , equipment evaluation , bed mobility/transfer out of bed evaluation , progressive ambulation with assistive devices prn .    2) Disposition Plan / Recs  :    return to [facility]

## 2022-10-11 NOTE — PROGRESS NOTE ADULT - PROBLEM SELECTOR PLAN 5
CT abdomen/pelvis on 10/9/2022 showed an enlarged prostate. Gutiérrez catheter within the bladder lumen. Bladder wall thickening due to muscular hypertrophy versus cystitis. Large rectal stool. No bowel obstruction. Gutiérrez catheter exchanged in ED. UA revealed + Jenny, WBC, and bacteria. Urine cx neg growth to date.   - Continue to hold tamsulosin and finasteride for now   - Consider ToV on floors CT abdomen/pelvis on 10/9/2022 showed an enlarged prostate. Gutiérrez catheter within the bladder lumen. Bladder wall thickening due to muscular hypertrophy versus cystitis. Large rectal stool. No bowel obstruction. Gutiérrez catheter exchanged in ED. UA revealed + Jenny, WBC, and bacteria. Urine cx neg growth to date.   - Restarting tamsulosin and finasteride home medications    - Will attempt trial of void later today, 10/11/2022

## 2022-10-11 NOTE — PHYSICAL THERAPY INITIAL EVALUATION ADULT - ADDITIONAL COMMENTS
Pt is admitted from [facility] nursing home. Pt is a poor historian and reports to be able to transfer from bed to chair with assist and gilson lift. at rehab.

## 2022-10-11 NOTE — DISCHARGE NOTE PROVIDER - NSDCMRMEDTOKEN_GEN_ALL_CORE_FT
amLODIPine 10 mg oral tablet: 1 tab(s) orally once a day  aspirin 81 mg oral tablet, chewable: 1 tab(s) orally once a day  atorvastatin 80 mg oral tablet: 1 tab(s) orally once a day  baclofen 10 mg oral tablet: 1 tab(s) orally 3 times a day  esomeprazole 40 mg oral delayed release capsule: 1 cap(s) orally once a day  finasteride 5 mg oral tablet: 1 tab(s) orally once a day  sertraline 50 mg oral tablet: 1 tab(s) orally once a day  tamsulosin 0.4 mg oral capsule: 1 cap(s) orally once a day   amLODIPine 10 mg oral tablet: 1 tab(s) orally once a day  aspirin 81 mg oral tablet, chewable: 1 tab(s) orally once a day  atorvastatin 80 mg oral tablet: 1 tab(s) orally once a day  baclofen 10 mg oral tablet: 1 tab(s) orally 3 times a day  esomeprazole 40 mg oral powder for reconstitution, delayed release: 1 each orally once a day  finasteride 5 mg oral tablet: 1 tab(s) orally once a day  sertraline 50 mg oral tablet: 1 tab(s) orally once a day  tamsulosin 0.4 mg oral capsule: 1 cap(s) orally once a day   amLODIPine 10 mg oral tablet: 1 tab(s) orally once a day  aspirin 81 mg oral tablet, chewable: 1 tab(s) orally once a day  atorvastatin 80 mg oral tablet: 1 tab(s) orally once a day  baclofen 10 mg oral tablet: 1 tab(s) orally 3 times a day, As Needed - for muscle spasm  esomeprazole 40 mg oral powder for reconstitution, delayed release: 1 each orally once a day  finasteride 5 mg oral tablet: 1 tab(s) orally once a day  sertraline 50 mg oral tablet: 1 tab(s) orally once a day  tamsulosin 0.4 mg oral capsule: 1 cap(s) orally once a day   amLODIPine 10 mg oral tablet: 1 tab(s) orally once a day  aspirin 81 mg oral tablet, chewable: 1 tab(s) orally once a day  atorvastatin 80 mg oral tablet: 1 tab(s) orally once a day  baclofen 10 mg oral tablet: 1 tab(s) orally 3 times a day, As Needed - for muscle spasm  enoxaparin: 40 milligram(s) subcutaneous once a day (at bedtime)  esomeprazole 40 mg oral powder for reconstitution, delayed release: 1 each orally once a day  finasteride 5 mg oral tablet: 1 tab(s) orally once a day  sertraline 50 mg oral tablet: 1 tab(s) orally once a day  tamsulosin 0.4 mg oral capsule: 1 cap(s) orally once a day   amLODIPine 10 mg oral tablet: 1 tab(s) orally once a day  aspirin 81 mg oral tablet, chewable: 1 tab(s) orally once a day  atorvastatin 80 mg oral tablet: 1 tab(s) orally once a day  baclofen 10 mg oral tablet: 1 tab(s) orally 3 times a day, As Needed - for muscle spasm  enoxaparin: 40 milligram(s) subcutaneous once a day (at bedtime)  esomeprazole 40 mg oral powder for reconstitution, delayed release: 1 each orally once a day  finasteride 5 mg oral tablet: 1 tab(s) orally once a day  piperacillin-tazobactam: 3.375 gram(s) intravenous every 6 hours. Last dose 10/15/22 at 6PM.  sertraline 50 mg oral tablet: 1 tab(s) orally once a day  tamsulosin 0.4 mg oral capsule: 1 cap(s) orally once a day

## 2022-10-11 NOTE — DISCHARGE NOTE PROVIDER - NSDCFUADDAPPT_GEN_ALL_CORE_FT
Please follow up with your primary care physician 1 week after discharge Please follow up with your primary care physician and neurologist to discuss your recent hospitalization and for further evaluation and management.

## 2022-10-11 NOTE — PROGRESS NOTE ADULT - ASSESSMENT
Patient is a 80 y/o M HLD, BPH diverticulitis, anxiety, depression with recent admission to [facility] for stroke presenting with AMS and hypotension found to be septic 2/2 aspiration pna vs uti.

## 2022-10-11 NOTE — PROGRESS NOTE ADULT - PROBLEM SELECTOR PLAN 2
Pt with magdaleno in place that was exchanged in ED on 10/9/2022. Currently being treated w/ zosyn for aspiration PNA. Given pt's AMS and inability to obtain reliable ROS, recommend treating empirically for UA.   - F/u w/ urine cxs   - Assess mental status daily Pt with magdaleno in place that was exchanged in ED on 10/9/2022. Currently being treated w/ zosyn for aspiration PNA. Leukocytosis on admission down-trending, pt more conversive. Magdaleno in place, pt producing urine. Urine cxs and blood cxs showing no growth to date.   - C/w zosyn 3.375g q8h for complicated UTI (male with magdaleno in place).    - Assess mental status daily

## 2022-10-11 NOTE — DISCHARGE NOTE PROVIDER - NSDCCPCAREPLAN_GEN_ALL_CORE_FT
PRINCIPAL DISCHARGE DIAGNOSIS  Diagnosis: Septic shock  Assessment and Plan of Treatment: Sepsis is a serious bodily reaction to an infection. The infection that triggers sepsis may be from a bacteria, virus, or fungus. Sepsis can result from an infection in any part of your body. You were found to have a urinary tract infection, likely from your magdaleno catheter that was present prior to admission. Additionally, you were found to have imaging suggestive of pneumonia in your lungs, which was treated right away with antibiotics. In severe cases, it can lead to septic shock. Septic shock can weaken your heart and cause your blood pressure to drop. This can cause your central nervous system and your body's organs to stop working. You had a brief stay in our medical ICU due to shock that required medications called "pressors" to keep your blood pressure above normal. You have since had normal blood pressure and have been treated for your infections. Please follow up with your primary care physician to continue management and long term care.      SECONDARY DISCHARGE DIAGNOSES  Diagnosis: Dysphagia, post-stroke  Assessment and Plan of Treatment: You had difficulty swallowing throughout your hospital stay due to a stroke you suffered at [facility]. As a result, you have a PEG tube in place that can be used for medications and for feeds. The speech and swallow team at [facility] has evaluated you and deemed you a high risk for aspirating (vomiting) the contents of food or liquids you take by mouth. It is highly encouraged for you to eat pureed foods if you will ingest any foods and to exercise a high degree of caution when eating/ingesting anything by mouth as well. Please follow up with your primary care physician for continued management and long term care.     PRINCIPAL DISCHARGE DIAGNOSIS  Diagnosis: Septic shock  Assessment and Plan of Treatment: Sepsis is a serious bodily reaction to an infection. The infection that triggers sepsis may be from a bacteria, virus, or fungus. Sepsis can result from an infection in any part of your body. You were found to have a urinary tract infection, likely from your magadleno catheter that was present prior to admission. Additionally, you were found to have imaging suggestive of pneumonia in your lungs, which was treated right away with antibiotics. In severe cases, it can lead to septic shock. Septic shock can weaken your heart and cause your blood pressure to drop. This can cause your central nervous system and your body's organs to stop working. You had a brief stay in our medical ICU due to shock that required medications called "pressors" to keep your blood pressure above normal. You have since had normal blood pressure and have been treated for your infections. In total you completed a 7 day course of antibiotic zosyn. Please follow up with your primary care physician to continue management and long term care.      SECONDARY DISCHARGE DIAGNOSES  Diagnosis: Dysphagia, post-stroke  Assessment and Plan of Treatment: You had difficulty swallowing throughout your hospital stay due to a stroke you suffered at [facility]. As a result, you have a PEG tube in place that can be used for medications and for feeds. The speech and swallow team at [facility] has evaluated you and deemed you a high risk for aspirating (vomiting) the contents of food or liquids you take by mouth. It is highly encouraged for you to exercise a high degree of caution when eating/ingesting anything by mouth as well. Please follow up with your primary care physician for continued management and long term care.     PRINCIPAL DISCHARGE DIAGNOSIS  Diagnosis: Septic shock  Assessment and Plan of Treatment: Sepsis is a serious bodily reaction to an infection. The infection that triggers sepsis may be from a bacteria, virus, or fungus. Sepsis can result from an infection in any part of your body. You were found to have a urinary tract infection, likely from your magdaleno catheter that was present prior to admission. Additionally, you were found to have imaging suggestive of pneumonia in your lungs, which was treated right away with antibiotics. In severe cases, it can lead to septic shock. Septic shock can weaken your heart and cause your blood pressure to drop. This can cause your central nervous system and your body's organs to stop working. You had a brief stay in our medical ICU due to shock that required medications called "pressors" to keep your blood pressure above normal. You have since had normal blood pressure and have been treated for your infections. In total you will have completed a 7 day course of antibiotic zosyn with your final dose on 10/15/22 at 6PM.. Please follow up with your primary care physician to continue management and long term care.      SECONDARY DISCHARGE DIAGNOSES  Diagnosis: Dysphagia, post-stroke  Assessment and Plan of Treatment: You had difficulty swallowing throughout your hospital stay due to a stroke you suffered at [facility]. As a result, you have a PEG tube in place that can be used for medications and for feeds. The speech and swallow team at [facility] has evaluated you and deemed you a high risk for aspirating (vomiting) the contents of food or liquids you take by mouth. It is highly encouraged for you to exercise a high degree of caution when eating/ingesting anything by mouth as well. Please follow up with your primary care physician for continued management and long term care.

## 2022-10-11 NOTE — PROGRESS NOTE ADULT - PROBLEM SELECTOR PLAN 1
Pt with AMS likely 2/2 to UTI vs aspiration PNA. Per MICU team, aspiration PNA unlikely to be cause of AMS given equivocal CXR results. CT chest showing Pulmonary opacities in both lower lobes due to atelectasis/pneumonia.  - C/w zosyn for mgmt of aspiration PNA and UTI   - Obtain more collateral from [facility] Pt with AMS likely 2/2 to UTI vs aspiration PNA. CT chest showing Pulmonary opacities in both lower lobes due to atelectasis/pneumonia. Pt showing dramatic improvement of mental status this AM 10/11/2022 with continued treatment with abx. Leukocytosis downtrending at this time, pt producing urine with magdaleon in place. Pt cleared by speech and swallow for pureed foods, though pt still remains a high aspiration risk.   - C/w zosyn for mgmt of aspiration PNA and UTI   - Transition to PO medications   - Daily mental status checks

## 2022-10-11 NOTE — DISCHARGE NOTE PROVIDER - HOSPITAL COURSE
#Discharge: do not delete    Patient is a 80 y/o M HLD, BPH diverticulitis, anxiety, depression with recent admission to [facility] for stroke presenting with AMS and hypotension found to be septic 2/2 aspiration pna vs uti.      Hospital course (by problem):   Problem/Plan - 1:  ·  Problem: Metabolic encephalopathy.   ·  Plan: Pt with altered mental status likely from a urinary tract infection vs aspiration pneumonia. Imaging showed findings suggestive of a pneumonia in the emergency room. With continued antibiotic treatment for both the UTI and pneumonia, pt has become more alert and conversive. Your white count, otherwise known as leukocytosis which may be elevated in the setting of infection or inflammation was downtrending, and you were producing urine with a magdaleno and without one. Pt was cleared by speech and swallow for pureed foods, though pt still remains a high aspiration risk.   - Will continue with antibiotics as outpatient to treat your UTI and pneumonia.   - Transition to oral medications AS TOLERATED       Problem/Plan - 2:  ·  Problem: Urinary tract infection.   ·  Plan: Pt with magdaleno in place that was exchanged in ED on 10/9/2022. Pt has been treated with zosyn 3.375g every 8 hours for aspiration Pneumonia. Leukocytosis on admission down trending, pt more conversive. Magdaleno in place, pt producing urine. Urine cxs and blood cxs showing no growth to date.   - Continue as above       Problem/Plan - 3:  ·  Problem: Pneumonia, aspiration.   ·  Plan: Chest x-ray showing possible pneumonia. Pt saturating 98% on RA, not hypoxic or in need of oxygen.   - Continue with antibiotics as above     Problem/Plan - 4:  ·  Problem: Dysphagia, post-stroke.   ·  Plan: Per H&P, pt initially presented to [facility] on 8/12/22 for blurry vision, found to have stroke on MRI. Additionally, pt suffered from persistent dysphagia (difficulty swallowing) requiring PEG insertion which was done on 9/8 and reinserted on 9/14. Speech and swallow labels pt as high aspiration risk, but recommends pureed foods.   - Resume feeds and PO medications with strict aspiration precautions.       Problem/Plan - 5:  ·  Problem: BPH (benign prostatic hyperplasia).   ·  Plan: CT abdomen/pelvis on 10/9/2022 showed an enlarged prostate. Magdaleno catheter within the bladder lumen. Bladder wall thickening due to muscular hypertrophy versus cystitis (bladder infection).   - Continue with tamsulosin and finasteride home medications      Patient was discharged to: home     New medications:     Changes to old medications: None     Medications that were stopped: None     Items to follow up as outpatient: Primary care physician in 1 week     Physical exam at the time of discharge:  Constitutional: lethargic elderly man, NAD on room air, minimally conversive but answering questions.   HEENT: pupils equal and reactive to phone light b/l, anicteric sclera; no oropharyngeal erythema or exudates; MMM  Neck: supple, no appreciable JVD  Respiratory: CTA B/L, no W/R/R  Cardiovascular: +S1/S2, RRR  Gastrointestinal: abdomen soft, NT/ND  Extremities: WWP; no clubbing, cyanosis or edema  Vascular: 2+ radial, femoral, and DP/PT pulses B/L  Dermatologic: skin normal color and turgor; no visible rashes  Neurological: Moving head in all directions, tracking movements with eyes, moving all extremities against gravity.      #Discharge: do not delete    Patient is a 82 y/o M HLD, BPH, diverticulitis, anxiety, depression with recent admission to [facility] for stroke  presenting with AMS and hypotension found to be septic from aspiration pneumonia and UTI    Hospital course (by problem):   Problem/Plan - 1:  ·  Problem: Metabolic encephalopathy.   ·  Plan: Pt with altered mental status likely from a urinary tract infection vs aspiration pneumonia. Imaging showed findings suggestive of a pneumonia in the emergency room. With continued antibiotic treatment for both the UTI and pneumonia, pt has become more alert and conversive. Your white count, otherwise known as leukocytosis which may be elevated in the setting of infection or inflammation was downtrending, and you were producing urine with a magdaleno and without one. Pt was cleared by speech and swallow for pureed foods, though pt still remains a high aspiration risk.   - Will continue with antibiotics as outpatient to treat your UTI and pneumonia.   - Transition to oral medications AS TOLERATED       Problem/Plan - 2:  ·  Problem: Urinary tract infection.   ·  Plan: Pt with magdaleno in place that was exchanged in ED on 10/9/2022. Pt has been treated with zosyn 3.375g every 8 hours for aspiration Pneumonia. Leukocytosis on admission down trending, pt more conversive. Magdaleno in place, pt producing urine. Urine cxs and blood cxs showing no growth to date.   - Continue as above       Problem/Plan - 3:  ·  Problem: Pneumonia, aspiration.   ·  Plan: Chest x-ray showing possible pneumonia. Pt saturating 98% on RA, not hypoxic or in need of oxygen.   - Continue with antibiotics as above     Problem/Plan - 4:  ·  Problem: Dysphagia, post-stroke.   ·  Plan: Per H&P, pt initially presented to [facility] on 8/12/22 for blurry vision, found to have stroke on MRI. Additionally, pt suffered from persistent dysphagia (difficulty swallowing) requiring PEG insertion which was done on 9/8 and reinserted on 9/14. Speech and swallow labels pt as high aspiration risk, but recommends pureed foods.   - Resume feeds and PO medications with strict aspiration precautions.       Problem/Plan - 5:  ·  Problem: BPH (benign prostatic hyperplasia).   ·  Plan: CT abdomen/pelvis on 10/9/2022 showed an enlarged prostate. Magdaleno catheter within the bladder lumen. Bladder wall thickening due to muscular hypertrophy versus cystitis (bladder infection).   - Continue with tamsulosin and finasteride home medications      Patient was discharged to: home     New medications: Zosyn 3.375g IV q6h until 10/16/2022    Changes to old medications: None     Medications that were stopped: None     Items to follow up as outpatient: Primary care physician in 1 week     Physical exam at the time of discharge:  Constitutional: lethargic elderly man, NAD on room air, minimally conversive but answering questions.   HEENT: pupils equal and reactive to phone light b/l, anicteric sclera; no oropharyngeal erythema or exudates; MMM  Neck: supple, no appreciable JVD  Respiratory: CTA B/L, no W/R/R  Cardiovascular: +S1/S2, RRR  Gastrointestinal: abdomen soft, NT/ND  Extremities: WWP; no clubbing, cyanosis or edema  Vascular: 2+ radial, femoral, and DP/PT pulses B/L  Dermatologic: skin normal color and turgor; no visible rashes  Neurological: Moving head in all directions, tracking movements with eyes, moving all extremities against gravity.      #Discharge: do not delete    Patient is a 82 y/o M HLD, BPH, diverticulitis, anxiety, depression with recent admission to [facility] for stroke  presenting with AMS and hypotension found to be septic from aspiration pneumonia and UTI    Hospital course (by problem):   Problem/Plan - 1:  ·  Problem: Metabolic encephalopathy.   ·  Plan: Pt with altered mental status likely from a urinary tract infection vs aspiration pneumonia. Imaging showed findings suggestive of a pneumonia in the emergency room. With continued antibiotic treatment for both the UTI and pneumonia, pt has become more alert and conversive. Your white count, otherwise known as leukocytosis which may be elevated in the setting of infection or inflammation was downtrending, and you were producing urine with a magdaleno and without one. Pt was cleared by speech and swallow for pureed foods, though pt still remains a high aspiration risk.   - Will continue with antibiotics as outpatient to treat your UTI and pneumonia.   - Transition to oral medications AS TOLERATED       Problem/Plan - 2:  ·  Problem: Urinary tract infection.   ·  Plan: Pt with magdaleno in place that was exchanged in ED on 10/9/2022. Pt has been treated with zosyn 3.375g every 8 hours for aspiration Pneumonia. Leukocytosis on admission down trending, pt more conversive. Magdaleno in place, pt producing urine. Urine cxs and blood cxs showing no growth to date.   - Continue as above       Problem/Plan - 3:  ·  Problem: Pneumonia, aspiration.   ·  Plan: Chest x-ray showing possible pneumonia. Pt saturating 98% on RA, not hypoxic or in need of oxygen.   - Continue with antibiotics as above     Problem/Plan - 4:  ·  Problem: Dysphagia, post-stroke.   ·  Plan: Per H&P, pt initially presented to [facility] on 8/12/22 for blurry vision, found to have stroke on MRI. Additionally, pt suffered from persistent dysphagia (difficulty swallowing) requiring PEG insertion which was done on 9/8 and reinserted on 9/14. Speech and swallow labels pt as high aspiration risk, but recommends pureed foods.   - Resume feeds and PO medications with strict aspiration precautions.       Problem/Plan - 5:  ·  Problem: BPH (benign prostatic hyperplasia).   ·  Plan: CT abdomen/pelvis on 10/9/2022 showed an enlarged prostate. Magdaleno catheter within the bladder lumen. Bladder wall thickening due to muscular hypertrophy versus cystitis (bladder infection).   - Continue with tamsulosin and finasteride home medications      Patient was discharged to: home     New medications: Zosyn 3.375g IV q6h until 10/15/2022    Changes to old medications: None     Medications that were stopped: None     Items to follow up as outpatient: Primary care physician in 1 week     Physical exam at the time of discharge:  Constitutional: lethargic elderly man, NAD on room air, minimally conversive but answering questions.   HEENT: pupils equal and reactive to phone light b/l, anicteric sclera; no oropharyngeal erythema or exudates; MMM  Neck: supple, no appreciable JVD  Respiratory: CTA B/L, no W/R/R  Cardiovascular: +S1/S2, RRR  Gastrointestinal: abdomen soft, NT/ND  Extremities: WWP; no clubbing, cyanosis or edema  Vascular: 2+ radial, femoral, and DP/PT pulses B/L  Dermatologic: skin normal color and turgor; no visible rashes  Neurological: Moving head in all directions, tracking movements with eyes, moving all extremities against gravity.

## 2022-10-12 LAB
ALBUMIN SERPL ELPH-MCNC: 3.1 G/DL — LOW (ref 3.3–5)
ALP SERPL-CCNC: 120 U/L — SIGNIFICANT CHANGE UP (ref 40–120)
ALT FLD-CCNC: 50 U/L — HIGH (ref 10–45)
ANION GAP SERPL CALC-SCNC: 11 MMOL/L — SIGNIFICANT CHANGE UP (ref 5–17)
AST SERPL-CCNC: 29 U/L — SIGNIFICANT CHANGE UP (ref 10–40)
BILIRUB SERPL-MCNC: 0.4 MG/DL — SIGNIFICANT CHANGE UP (ref 0.2–1.2)
BUN SERPL-MCNC: 14 MG/DL — SIGNIFICANT CHANGE UP (ref 7–23)
CALCIUM SERPL-MCNC: 8.8 MG/DL — SIGNIFICANT CHANGE UP (ref 8.4–10.5)
CHLORIDE SERPL-SCNC: 107 MMOL/L — SIGNIFICANT CHANGE UP (ref 96–108)
CO2 SERPL-SCNC: 24 MMOL/L — SIGNIFICANT CHANGE UP (ref 22–31)
CREAT SERPL-MCNC: 0.85 MG/DL — SIGNIFICANT CHANGE UP (ref 0.5–1.3)
EGFR: 88 ML/MIN/1.73M2 — SIGNIFICANT CHANGE UP
GLUCOSE SERPL-MCNC: 130 MG/DL — HIGH (ref 70–99)
HCT VFR BLD CALC: 26.3 % — LOW (ref 39–50)
HGB BLD-MCNC: 8.5 G/DL — LOW (ref 13–17)
MAGNESIUM SERPL-MCNC: 1.8 MG/DL — SIGNIFICANT CHANGE UP (ref 1.6–2.6)
MCHC RBC-ENTMCNC: 30 PG — SIGNIFICANT CHANGE UP (ref 27–34)
MCHC RBC-ENTMCNC: 32.3 GM/DL — SIGNIFICANT CHANGE UP (ref 32–36)
MCV RBC AUTO: 92.9 FL — SIGNIFICANT CHANGE UP (ref 80–100)
NRBC # BLD: 0 /100 WBCS — SIGNIFICANT CHANGE UP (ref 0–0)
PHOSPHATE SERPL-MCNC: 2.8 MG/DL — SIGNIFICANT CHANGE UP (ref 2.5–4.5)
PLATELET # BLD AUTO: 191 K/UL — SIGNIFICANT CHANGE UP (ref 150–400)
POTASSIUM SERPL-MCNC: 3.8 MMOL/L — SIGNIFICANT CHANGE UP (ref 3.5–5.3)
POTASSIUM SERPL-SCNC: 3.8 MMOL/L — SIGNIFICANT CHANGE UP (ref 3.5–5.3)
PROT SERPL-MCNC: 5.7 G/DL — LOW (ref 6–8.3)
RBC # BLD: 2.83 M/UL — LOW (ref 4.2–5.8)
RBC # FLD: 15.5 % — HIGH (ref 10.3–14.5)
SODIUM SERPL-SCNC: 142 MMOL/L — SIGNIFICANT CHANGE UP (ref 135–145)
WBC # BLD: 13.77 K/UL — HIGH (ref 3.8–10.5)
WBC # FLD AUTO: 13.77 K/UL — HIGH (ref 3.8–10.5)

## 2022-10-12 PROCEDURE — 99232 SBSQ HOSP IP/OBS MODERATE 35: CPT | Mod: GC

## 2022-10-12 RX ADMIN — ENOXAPARIN SODIUM 40 MILLIGRAM(S): 100 INJECTION SUBCUTANEOUS at 09:45

## 2022-10-12 RX ADMIN — PIPERACILLIN AND TAZOBACTAM 25 GRAM(S): 4; .5 INJECTION, POWDER, LYOPHILIZED, FOR SOLUTION INTRAVENOUS at 01:06

## 2022-10-12 RX ADMIN — FINASTERIDE 5 MILLIGRAM(S): 5 TABLET, FILM COATED ORAL at 12:18

## 2022-10-12 RX ADMIN — ATORVASTATIN CALCIUM 80 MILLIGRAM(S): 80 TABLET, FILM COATED ORAL at 22:12

## 2022-10-12 RX ADMIN — PIPERACILLIN AND TAZOBACTAM 25 GRAM(S): 4; .5 INJECTION, POWDER, LYOPHILIZED, FOR SOLUTION INTRAVENOUS at 17:40

## 2022-10-12 RX ADMIN — CHLORHEXIDINE GLUCONATE 1 APPLICATION(S): 213 SOLUTION TOPICAL at 06:10

## 2022-10-12 RX ADMIN — Medication 81 MILLIGRAM(S): at 12:18

## 2022-10-12 RX ADMIN — TAMSULOSIN HYDROCHLORIDE 0.4 MILLIGRAM(S): 0.4 CAPSULE ORAL at 22:12

## 2022-10-12 RX ADMIN — PIPERACILLIN AND TAZOBACTAM 25 GRAM(S): 4; .5 INJECTION, POWDER, LYOPHILIZED, FOR SOLUTION INTRAVENOUS at 09:45

## 2022-10-12 RX ADMIN — SERTRALINE 50 MILLIGRAM(S): 25 TABLET, FILM COATED ORAL at 12:18

## 2022-10-12 NOTE — PROGRESS NOTE ADULT - PROBLEM SELECTOR PLAN 4
Per H&P, pt initially presented to [facility] on 8/12/22 for blurry vision, found to have bithalamic ischemic stroke on MRI. His course was complicated by an in hospital fall then subsequent unresponsiveness requiring intubation and MICU care with 3 failed attempts at extubation first complicated by poor secretion management (8/16) then by stridor and airway edema (8/19). Extubated 8/24 and weaned down to NC, but then could not tolerate HFNC and was reintubated; vanc/cef/flagyl were given for possible HAP leading to extubation on 8/31, weaned to RA by 9/12. Per notes, patient suffered from persistent dysphagia requiring PEG insertion which was done on 9/8, s/p delirious PEG removal on 9/14 with successful reinsertion. On examination on [facility] on 10/11/2022, PEG site clear and without bleeding or discharge/erythema. CT chest w/ IV contrast on 10/9/2022 show percutaneous gastrostomy tube within the stomach lumen with colonic diverticulosis. Speech and swallow labels pt as high aspiration risk, but recommends pureed foods.   - Resume feeds and PO medications with strict aspiration precautions  - Pt with slight gaze nystagmus likely d/2 prior CVA, no other focal deficits   - Pt can f/u with his outpatient Neurologist and PT

## 2022-10-12 NOTE — OCCUPATIONAL THERAPY INITIAL EVALUATION ADULT - ADDITIONAL COMMENTS
Pt d/c'd from [facility] to [facility] on 9/21. Pt admitted from [facility]. Pt is a questionable historian. Pt reports he required assist for all ADLs, was independent with self-feeding. Pt unable to provide accurate description of mobility at rehab. Per aide who reports pt's family stated he was able to stand with a RW to transfer from bed>w/c. Per chart pt has 24/7 private aides at [facility]. Prior to previous admission at [facility] pt was independently with ADls and ambulated with no device.

## 2022-10-12 NOTE — OCCUPATIONAL THERAPY INITIAL EVALUATION ADULT - GENERAL OBSERVATIONS, REHAB EVAL
OT IE Completed. Pt's RN Ellie cleared pt for therapy. Rehab Tech Navdeep present. Pt's aide (?private hire) at bedside reports she was with pt today and yesterday. Pt tolerated session fairly well, confused throughout and perseverating on urination/bowel movement output being recorded. Pt left as found, +bed alarm, all needs in reach, RN Ellie aware. OT IE Completed. Pt's RN Ellie cleared pt for therapy. Rehab Tech Navdeep present. Pt's aide at bedside reports she was with pt today and yesterday. Pt tolerated session fairly well, confused throughout and perseverating on urination/bowel movement output being recorded. Pt left as found, +bed alarm, all needs in reach, YUNG Argueta aware.

## 2022-10-12 NOTE — PROGRESS NOTE ADULT - PROBLEM SELECTOR PLAN 1
Pt with AMS likely 2/2 to UTI vs aspiration PNA. CT chest showing Pulmonary opacities in both lower lobes due to atelectasis/pneumonia. Pt showing dramatic improvement of mental status this AM 10/11/2022 with continued treatment with abx. Leukocytosis downtrending at this time, pt producing urine magdaleno out. Pt cleared by speech and swallow for pureed foods, though pt still remains a high aspiration risk. Currently resuming all PO medications but holding amlodipine i/s/o hypotension.   - C/w zosyn for mgmt of hospital acquired PNA and UTI   - Can c/w Zosyn at [facility] if pt's mental status improves

## 2022-10-12 NOTE — PROGRESS NOTE ADULT - ATTENDING COMMENTS
82 y/o M HLD, BPH requiring chronic magdaleno, diverticulitis, anxiety, depression with recent admission to [facility] for stroke (39 day admission, c/b multiple intubations, dysphagia and PEG tube placement, d/c to nursing home in stable condition) who presented with septic shock and metabolic encephalopathy likely due to complicated UTI and aspiration PNA    #Septic Shock - due to UTI and PNA. Resolved  #metabolic encephalopathy - due to infections below, resolved  #UTI  #Hospital acquired pneumonia   - c/w Zosyn for complicated UTI due to Magdaleno as well as HAP/aspiration gram negative coverage.  Will cover for 7 days  - mental status back to baseline    #BPH  - resume finasteride and flomax  - removed Magdaleno and passed TOV.  OOBTC, maintain bowel regimen    #CVA  - bithalamic ischemic stroke on MRI  - residual deficits, now returned to baseline  - s/p PEG and patient with dysphagia.  Passed for soft and bite size  - aspiration precautions  - PT consult recommends DANIEL    #Anemia  - likely AOCD, stable Hgb and no signs of bleeding  - trend Hgb    #Transaminitis  - likely due to sepsis, improved  - RUQ with gallbladder sludge, no signs of obstruction    #Functional quadriplegia  - s/p CVA, PEG  - has HHAs, likely for DANIEL    DVT ppx: Lovenox  Dispo: DANIEL  Code status: DNR but would trial intubation

## 2022-10-12 NOTE — OCCUPATIONAL THERAPY INITIAL EVALUATION ADULT - PHYSICAL ASSIST/NONPHYSICAL ASSIST:DRESS LOWER BODY, OT EVAL
Pt was referred by Dr. Roc Marsh for tachycardia. Pt was scheduled with Dr. Gosia Hemphill on 8/27/19 @ 8:15. Past cardiac hx form scanned. Former [facility] pt.    Last seen 6/7/16
verbal cues/nonverbal cues (demo/gestures)/1 person assist

## 2022-10-12 NOTE — PROGRESS NOTE ADULT - SUBJECTIVE AND OBJECTIVE BOX
CC: Patient is a 81y old  Male who presents with a chief complaint of AMS 2/2 aspiration PNA vs UTI     INTERVAL HPI/OVERNIGHT EVENTS: Pt with magdaleno out, currently producing urine. More receptive to conversations.     Subjective: Patient seen and examined at bedside. Denies ROS.     ROS: negative unless otherwise stated above.    VITAL SIGNS:  Vital Signs Last 24 Hrs  T(C): 37.3 (10 Oct 2022 09:50), Max: 37.3 (10 Oct 2022 09:50)  T(F): 99.1 (10 Oct 2022 09:50), Max: 99.1 (10 Oct 2022 09:50)  HR: 84 (10 Oct 2022 13:00) (67 - 84)  BP: 143/76 (10 Oct 2022 13:00) (85/51 - 143/76)  BP(mean): 105 (10 Oct 2022 13:00) (63 - 105)  RR: 16 (10 Oct 2022 13:00) (14 - 20)  SpO2: 95% (10 Oct 2022 13:00) (93% - 96%)    Parameters below as of 10 Oct 2022 14:00  Patient On (Oxygen Delivery Method): room air          10-09-22 @ 07:  -  10-10-22 @ 07:00  --------------------------------------------------------  IN: 1194.3 mL / OUT: 1265 mL / NET: -70.7 mL    10-10-22 @ 07:01  -  10-10-22 @ 13:52  --------------------------------------------------------  IN: 200 mL / OUT: 245 mL / NET: -45 mL        PHYSICAL EXAM:    General: NAD  HEENT: MMM  Neck: supple  Cardiovascular: +S1/S2; RRR  Respiratory: CTA B/L; no W/R/R  Gastrointestinal: soft, NT/ND  Extremities: WWP; no edema, clubbing or cyanosis  Vascular: 2+ radial, DP/PT pulses B/L  Neurological: AAOx3; no focal deficits    MEDICATIONS:  MEDICATIONS  (STANDING):  aspirin  chewable 81 milliGRAM(s) Enteral Tube daily  atorvastatin 80 milliGRAM(s) Oral every 24 hours  chlorhexidine 2% Cloths 1 Application(s) Topical <User Schedule>  enoxaparin Injectable 40 milliGRAM(s) SubCutaneous every 24 hours  norepinephrine Infusion 0.05 MICROgram(s)/kG/Min (6.97 mL/Hr) IV Continuous <Continuous>  piperacillin/tazobactam IVPB.. 3.375 Gram(s) IV Intermittent every 8 hours  sertraline 50 milliGRAM(s) Oral every 24 hours    MEDICATIONS  (PRN):      ALLERGIES:  Allergies    No Known Allergies    Intolerances        LABS:                        7.9    38.99 )-----------( 166      ( 10 Oct 2022 05:18 )             24.2     10-10    141  |  108  |  20  ----------------------------<  101<H>  3.4<L>   |  23  |  0.88    Ca    8.5      10 Oct 2022 05:18  Phos  2.6     10-10  Mg     2.0     10-10    TPro  5.3<L>  /  Alb  2.7<L>  /  TBili  0.4  /  DBili  x   /  AST  26  /  ALT  44  /  AlkPhos  108  10-10    PT/INR - ( 09 Oct 2022 00:11 )   PT: 15.5 sec;   INR: 1.30          PTT - ( 09 Oct 2022 00:11 )  PTT:34.4 sec  Urinalysis Basic - ( 09 Oct 2022 02:02 )    Color: Red / Appearance: Cloudy / S.020 / pH: x  Gluc: x / Ketone: NEGATIVE  / Bili: Negative / Urobili: 0.2 E.U./dL   Blood: x / Protein: 100 mg/dL / Nitrite: NEGATIVE   Leuk Esterase: Small / RBC: > 10 /HPF / WBC > 10 /HPF   Sq Epi: x / Non Sq Epi: 0-5 /HPF / Bacteria: Present /HPF      CAPILLARY BLOOD GLUCOSE      POCT Blood Glucose.: 102 mg/dL (10 Oct 2022 05:09)      RADIOLOGY & ADDITIONAL TESTS: Reviewed.

## 2022-10-12 NOTE — OCCUPATIONAL THERAPY INITIAL EVALUATION ADULT - VISUAL ASSESSMENT: TRACKING
Pt able to grossly track therapist. Pt with decreased occular mobility noted, pt noted to be staring straight ahead/upwards however able to read therapist's fingers in lower quadrants without moving b/l eyes as pt continued to stare straight ahead/upwards.

## 2022-10-12 NOTE — OCCUPATIONAL THERAPY INITIAL EVALUATION ADULT - NS ASR FOLLOW COMMAND OT EVAL
requires repetitions for one step commands as pt confused throughout./75% of the time/able to follow single-step instructions

## 2022-10-12 NOTE — PROGRESS NOTE ADULT - PROBLEM SELECTOR PLAN 5
CT abdomen/pelvis on 10/9/2022 showed an enlarged prostate. Gutiérrez catheter within the bladder lumen. Bladder wall thickening due to muscular hypertrophy versus cystitis. Large rectal stool. No bowel obstruction. Gutiérrez catheter exchanged in ED. UA revealed + Jenny, WBC, and bacteria. Urine cx neg growth to date.   - C/w tamsulosin and finasteride home medications    - Trial of void passed yesterday 10/11/2022

## 2022-10-12 NOTE — OCCUPATIONAL THERAPY INITIAL EVALUATION ADULT - DIAGNOSIS, OT EVAL
Pt presents with slight confusion, decreased BUE/BLE strength, decreased postural control, functional endurance, and balance impacting his ability to independently complete ADLs, functional transfers, and functional mobility.

## 2022-10-12 NOTE — PROGRESS NOTE ADULT - PROBLEM SELECTOR PLAN 2
Pt with magdaleno in place that was exchanged in ED on 10/9/2022. Currently being treated w/ zosyn for aspiration PNA. Leukocytosis on admission down-trending, pt more conversive. Magdaleno in place, pt producing urine. Urine cxs and blood cxs showing no growth to date.   - C/w zosyn 3.375g q8h for complicated UTI and hospital acquired pneumonia   - Mental status improving significantly 10/12/2022

## 2022-10-12 NOTE — PROGRESS NOTE ADULT - ASSESSMENT
Patient is a 82 y/o M HLD, BPH diverticulitis, anxiety, depression with recent admission to [facility] for stroke presenting with AMS and hypotension found to be septic 2/2 aspiration pna vs uti.

## 2022-10-12 NOTE — OCCUPATIONAL THERAPY INITIAL EVALUATION ADULT - MANUAL MUSCLE TESTING RESULTS, REHAB EVAL
BUE shoulder flexion/extension grossly 3-/5, BUE elbow flexion/extension grossly 3+/5, BUE  strength 4-/5. BLE knee flexion/extension grossly 3+/5, BLE ankle DF/PF 4/5.

## 2022-10-12 NOTE — OCCUPATIONAL THERAPY INITIAL EVALUATION ADULT - ORIENTATION, REHAB EVAL
Pt oriented to name only, oriented to birthday with 2 options for month and year. Pt disoriented to place and date.

## 2022-10-12 NOTE — OCCUPATIONAL THERAPY INITIAL EVALUATION ADULT - MD ORDER
Patient is a 82 y/o M HLD, BPH diverticulitis, anxiety, depression with recent admission to [facility] for stroke now presenting with AMS and hypotension found to be septic 2/2 aspiration pna vs uti requiring levophed.

## 2022-10-12 NOTE — PROGRESS NOTE ADULT - PROBLEM SELECTOR PLAN 3
CXR showing discoid change and/or infiltrate left lung base. Pt currently satting 98% on RA, currently protecting airway.   - C/w zosyn 3.375g for total of 7 days, beginning on 10/9/2022 for HAP   - Trend leukocytosis which is currently downtrending but elevated   - Continue to assess mental status daily

## 2022-10-13 LAB
ALBUMIN SERPL ELPH-MCNC: 3 G/DL — LOW (ref 3.3–5)
ALP SERPL-CCNC: 115 U/L — SIGNIFICANT CHANGE UP (ref 40–120)
ALT FLD-CCNC: 38 U/L — SIGNIFICANT CHANGE UP (ref 10–45)
ANION GAP SERPL CALC-SCNC: 9 MMOL/L — SIGNIFICANT CHANGE UP (ref 5–17)
AST SERPL-CCNC: 20 U/L — SIGNIFICANT CHANGE UP (ref 10–40)
BILIRUB SERPL-MCNC: 0.3 MG/DL — SIGNIFICANT CHANGE UP (ref 0.2–1.2)
BLD GP AB SCN SERPL QL: NEGATIVE — SIGNIFICANT CHANGE UP
BLD GP AB SCN SERPL QL: NEGATIVE — SIGNIFICANT CHANGE UP
BUN SERPL-MCNC: 12 MG/DL — SIGNIFICANT CHANGE UP (ref 7–23)
CALCIUM SERPL-MCNC: 9 MG/DL — SIGNIFICANT CHANGE UP (ref 8.4–10.5)
CHLORIDE SERPL-SCNC: 108 MMOL/L — SIGNIFICANT CHANGE UP (ref 96–108)
CO2 SERPL-SCNC: 24 MMOL/L — SIGNIFICANT CHANGE UP (ref 22–31)
CREAT SERPL-MCNC: 0.88 MG/DL — SIGNIFICANT CHANGE UP (ref 0.5–1.3)
EGFR: 87 ML/MIN/1.73M2 — SIGNIFICANT CHANGE UP
GLUCOSE SERPL-MCNC: 116 MG/DL — HIGH (ref 70–99)
HCT VFR BLD CALC: 27.2 % — LOW (ref 39–50)
HGB BLD-MCNC: 8.6 G/DL — LOW (ref 13–17)
MAGNESIUM SERPL-MCNC: 1.8 MG/DL — SIGNIFICANT CHANGE UP (ref 1.6–2.6)
MCHC RBC-ENTMCNC: 29.7 PG — SIGNIFICANT CHANGE UP (ref 27–34)
MCHC RBC-ENTMCNC: 31.6 GM/DL — LOW (ref 32–36)
MCV RBC AUTO: 93.8 FL — SIGNIFICANT CHANGE UP (ref 80–100)
NRBC # BLD: 0 /100 WBCS — SIGNIFICANT CHANGE UP (ref 0–0)
PHOSPHATE SERPL-MCNC: 3.2 MG/DL — SIGNIFICANT CHANGE UP (ref 2.5–4.5)
PLATELET # BLD AUTO: 219 K/UL — SIGNIFICANT CHANGE UP (ref 150–400)
POTASSIUM SERPL-MCNC: 4 MMOL/L — SIGNIFICANT CHANGE UP (ref 3.5–5.3)
POTASSIUM SERPL-SCNC: 4 MMOL/L — SIGNIFICANT CHANGE UP (ref 3.5–5.3)
PROT SERPL-MCNC: 5.6 G/DL — LOW (ref 6–8.3)
RBC # BLD: 2.9 M/UL — LOW (ref 4.2–5.8)
RBC # FLD: 15.3 % — HIGH (ref 10.3–14.5)
RH IG SCN BLD-IMP: POSITIVE — SIGNIFICANT CHANGE UP
RH IG SCN BLD-IMP: POSITIVE — SIGNIFICANT CHANGE UP
SODIUM SERPL-SCNC: 141 MMOL/L — SIGNIFICANT CHANGE UP (ref 135–145)
WBC # BLD: 10.95 K/UL — HIGH (ref 3.8–10.5)
WBC # FLD AUTO: 10.95 K/UL — HIGH (ref 3.8–10.5)

## 2022-10-13 PROCEDURE — 99232 SBSQ HOSP IP/OBS MODERATE 35: CPT | Mod: GC

## 2022-10-13 RX ORDER — ESOMEPRAZOLE MAGNESIUM 40 MG/1
1 CAPSULE, DELAYED RELEASE ORAL
Qty: 0 | Refills: 0 | DISCHARGE

## 2022-10-13 RX ORDER — FINASTERIDE 5 MG/1
1 TABLET, FILM COATED ORAL
Qty: 0 | Refills: 0 | DISCHARGE

## 2022-10-13 RX ORDER — TAMSULOSIN HYDROCHLORIDE 0.4 MG/1
1 CAPSULE ORAL
Qty: 0 | Refills: 0 | DISCHARGE

## 2022-10-13 RX ORDER — SERTRALINE 25 MG/1
1 TABLET, FILM COATED ORAL
Qty: 0 | Refills: 0 | DISCHARGE

## 2022-10-13 RX ORDER — INFLUENZA VIRUS VACCINE 15; 15; 15; 15 UG/.5ML; UG/.5ML; UG/.5ML; UG/.5ML
0.7 SUSPENSION INTRAMUSCULAR ONCE
Refills: 0 | Status: COMPLETED | OUTPATIENT
Start: 2022-10-13 | End: 2022-10-13

## 2022-10-13 RX ADMIN — FINASTERIDE 5 MILLIGRAM(S): 5 TABLET, FILM COATED ORAL at 12:03

## 2022-10-13 RX ADMIN — ENOXAPARIN SODIUM 40 MILLIGRAM(S): 100 INJECTION SUBCUTANEOUS at 09:30

## 2022-10-13 RX ADMIN — PIPERACILLIN AND TAZOBACTAM 25 GRAM(S): 4; .5 INJECTION, POWDER, LYOPHILIZED, FOR SOLUTION INTRAVENOUS at 01:03

## 2022-10-13 RX ADMIN — TAMSULOSIN HYDROCHLORIDE 0.4 MILLIGRAM(S): 0.4 CAPSULE ORAL at 22:10

## 2022-10-13 RX ADMIN — ATORVASTATIN CALCIUM 80 MILLIGRAM(S): 80 TABLET, FILM COATED ORAL at 22:10

## 2022-10-13 RX ADMIN — PIPERACILLIN AND TAZOBACTAM 25 GRAM(S): 4; .5 INJECTION, POWDER, LYOPHILIZED, FOR SOLUTION INTRAVENOUS at 09:30

## 2022-10-13 RX ADMIN — CHLORHEXIDINE GLUCONATE 1 APPLICATION(S): 213 SOLUTION TOPICAL at 07:02

## 2022-10-13 RX ADMIN — INFLUENZA VIRUS VACCINE 0.7 MILLILITER(S): 15; 15; 15; 15 SUSPENSION INTRAMUSCULAR at 16:25

## 2022-10-13 RX ADMIN — Medication 81 MILLIGRAM(S): at 12:03

## 2022-10-13 RX ADMIN — PANTOPRAZOLE SODIUM 40 MILLIGRAM(S): 20 TABLET, DELAYED RELEASE ORAL at 12:03

## 2022-10-13 RX ADMIN — SERTRALINE 50 MILLIGRAM(S): 25 TABLET, FILM COATED ORAL at 12:03

## 2022-10-13 RX ADMIN — PIPERACILLIN AND TAZOBACTAM 25 GRAM(S): 4; .5 INJECTION, POWDER, LYOPHILIZED, FOR SOLUTION INTRAVENOUS at 17:49

## 2022-10-13 NOTE — PROGRESS NOTE ADULT - SUBJECTIVE AND OBJECTIVE BOX
Physical Medicine and Rehabilitation Progress Note :       Patient is a 81y old  Male who presents with a chief complaint of Septic Shock (12 Oct 2022 10:51)      HPI:  81 M with PMHx anxiety, HTN, BPH, diverticulitis, recent ischemic CVA s/p 39d admission at [facility] from [facility] due to concern for UTI.  Initially presented to [facility] on 8/12 for blurry vision, found to have bithalamic ischemic stroke on MRI. His course was complicated by an in hospital fall then subsequent unresponsiveness requiring intubation and MICU care. He had 3 failed attempts at extubation first complicated by poor secretion management (8/16) then by stridor and airway edema (8/19). Extubated 8/24 and weaned down to NC, but then could not tolerate HFNC and was reintubated; vanc/cef/flagyl were given for possible HAP (per son was MRSA but unknown if this was sputum culture or nare PCR). Extubated on 8/31 then transferred to floors, weaned to  by 9/12.  Patient suffered from persistent dysphagia requiring PEG insertion which was done on 9/8, s/p delirious PEG removal on 9/14 with successful reinsertion. TOV intermittently failing requiring discharge with magdaleno catheter. Also diagnosed with Ileus which resolved with disimpaction and bowel regimen.    Discharged to [facility] on 9/21, vitals prior to discharge notable for HR 70s-80s BP 120s-130s/70s.  At rehab was starting to ambulate and take PO with supplemental PEG feeds. He was interactive and improving until morning of 10/8 where he was noted to be nonverbal and would not take PO. Vital signs at [facility] notable for BP 79/58 while supine, no temporal fever. Magdaleno was exchanged there and patient given 2g ceftriaxone and 200cc NS. BP did not respond so they notified EMS, who arrived and started norepinephrine.      ED course:  Vitals: 100.6F, HR 84, BP 82/52 prompting initiation of levo, RR16 97% RA  Labs: WBC 37 w/ neutrophil predominance, Hb 9.2/Hct 29.1, lactate 4.1, BUN 19/Cr 1.19, Trop 0.04, UA >10 wbcs, Leukocyte esterase +  Imaging: EKG NSR with Qs in V1-2  Interventions: tylenol, vanc x1, zosyn, 2L NS, levophed initiated for MAP<65 (09 Oct 2022 03:05)                            8.6    10.95 )-----------( 219      ( 13 Oct 2022 05:57 )             27.2       10-13    141  |  108  |  12  ----------------------------<  116<H>  4.0   |  24  |  0.88    Ca    9.0      13 Oct 2022 05:57  Phos  3.2     10-13  Mg     1.8     10-13    TPro  5.6<L>  /  Alb  3.0<L>  /  TBili  0.3  /  DBili  x   /  AST  20  /  ALT  38  /  AlkPhos  115  10-13    Vital Signs Last 24 Hrs  T(C): 36.9 (13 Oct 2022 14:00), Max: 37.1 (12 Oct 2022 21:43)  T(F): 98.5 (13 Oct 2022 14:00), Max: 98.7 (12 Oct 2022 21:43)  HR: 78 (13 Oct 2022 14:00) (71 - 78)  BP: 136/86 (13 Oct 2022 14:00) (136/86 - 155/79)  BP(mean): --  RR: 18 (13 Oct 2022 14:00) (16 - 18)  SpO2: 93% (13 Oct 2022 14:00) (92% - 97%)    Parameters below as of 12 Oct 2022 21:43  Patient On (Oxygen Delivery Method): room air        MEDICATIONS  (STANDING):  aspirin  chewable 81 milliGRAM(s) Oral daily  atorvastatin 80 milliGRAM(s) Oral every 24 hours  chlorhexidine 2% Cloths 1 Application(s) Topical <User Schedule>  enoxaparin Injectable 40 milliGRAM(s) SubCutaneous every 24 hours  finasteride 5 milliGRAM(s) Oral daily  pantoprazole  Injectable 40 milliGRAM(s) IV Push every 24 hours  piperacillin/tazobactam IVPB.. 3.375 Gram(s) IV Intermittent every 8 hours  sertraline 50 milliGRAM(s) Oral every 24 hours  tamsulosin 0.4 milliGRAM(s) Oral at bedtime    MEDICATIONS  (PRN):       Physical / Occupational Therapy Functional Status Assessment :   10/12/2022      Cognitive/Neuro/Behavioral  Cognitive/Neuro/Behavioral [WDL Definition: Alert; opens eyes spontaneously; arouses to voice or touch; oriented x 4; follows commands; speech spontaneous, logical; purposeful motor response; behavior appropriate to situation]: WDL except  Level of Consciousness: alert  Arousal Level: arouses to voice  Orientation: disoriented to;  time;  place  Speech: clear  Mood/Behavior: cooperative;  flat affect    Language Assistance  Preferred Language to Address Healthcare Preferred Language to Address Healthcare: English    Therapeutic Interventions      Bed Mobility  Bed Mobility Training Rehab Potential: good, to achieve stated therapy goals  Bed Mobility Training Symptoms Noted During/After Treatment: none  Bed Mobility Training Rolling/Turning: minimum assist (75% patient effort);  1 person assist;  nonverbal cues (demo/gestures);  bed rails  Bed Mobility Training Scooting: minimum assist (75% patient effort);  2 person assist;  verbal cues  Bed Mobility Training Bridging: unable to perform;  set-up required  Bed Mobility Training Sit-to-Supine: minimum assist (75% patient effort);  2 person assist;  verbal cues;  bed rails  Bed Mobility Training Supine-to-Sit: minimum assist (75% patient effort);  2 person assist;  bed rails;  verbal cues  Bed Mobility Training Limitations: decreased ability to use arms for pushing/pulling;  decreased ability to use legs for bridging/pushing;  decreased strength;  cognitive, decreased safety awareness;  reduced functional endurance     Sit-Stand Transfer Training  Sit-to-Stand Transfer Training Rehab Potential: good, to achieve stated therapy goals  Comment: Patient performed 4x STS at [facility]. Attempt to take steps at EOB, however, patient unwilling to and demonstrating difficulty following instructions for WSing.  Sit-to-Stand Transfer Training Symptoms Noted During/After Treatment: none  Transfer Training Sit-to-Stand Transfer: moderate assist (50% patient effort);  2 person assist;  verbal cues;  set-up required;  full weight-bearing   rolling walker  Transfer Training Stand-to-Sit Transfer: minimum assist (75% patient effort);  2 person assist;  verbal cues;  full weight-bearing   rolling walker  Sit-to-Stand Transfer Training Transfer Safety Analysis: decreased balance;  decreased cognition;  decreased weight-shifting ability;  impaired balance;  decreased strength;  cognitive, decreased safety awareness;  reduced functional endurance ;  rolling walker    Therapeutic Exercise  Therapeutic Exercise Rehab Effort: good  Therapeutic Exercise Symptoms Noted During/After Treatment: none  Therapeutic Exercise Detail: Patient participated in the following therex to improve BLE strength to prepare for functional activities:- AAROM hip/knee flex/ext in supine (2x10)- SLRs with assistance (10x ea)- LAQ with assistance (10x ea)- attempted bridges (patient required set up and VCing, but was unable to lift hips up to actively perform bridge).     Visual Assessment:   · Visual Acuity	Pt able to read clock on wall ~10ft away with accuracy.  · Visual Tracking	Pt able to grossly track therapist. Pt with decreased occular mobility noted, pt noted to be staring straight ahead/upwards however able to read therapist's fingers in lower quadrants without moving b/l eyes as pt continued to stare straight ahead/upwards.    Fine Motor Coordination:     Fine Motor Coordination:   · Left Hand Thumb/Finger Opposition Skills	normal performance   · Right Hand Thumb/Finger Opposition Skills	normal performance     Lower Body Dressing Training:     · Level of [place]	maximum assist (25% patients effort)  · Physical Assist/Nonphysical Assist	1 person assist; nonverbal cues (demo/gestures); verbal cues    Clinical Impression:   · Rehab Potential	good, to achieve stated therapy goals  · Therapy Frequency	2-3x/week              PM&R Impression : as above    Current Disposition Plan Recommendations :    subacute rehab placement

## 2022-10-13 NOTE — PROGRESS NOTE ADULT - PROBLEM SELECTOR PLAN 2
Pt with magdaleno in place that was exchanged in ED on 10/9/2022. Currently being treated w/ zosyn for aspiration PNA. Leukocytosis on admission down-trending, pt more conversive. Magdaleno in place, pt producing urine. Urine cxs and blood cxs showing no growth to date.   - C/w zosyn 3.375g q8h for complicated UTI and hospital acquired pneumonia   - Mental status improving significantly 10/13/2022

## 2022-10-13 NOTE — PROGRESS NOTE ADULT - ATTENDING COMMENTS
82 y/o M HLD, BPH requiring chronic magdaleno, diverticulitis, anxiety, depression with recent admission to [facility] for stroke (39 day admission, c/b multiple intubations, dysphagia and PEG tube placement, d/c to nursing home in stable condition) who presented with septic shock and metabolic encephalopathy likely due to complicated UTI and aspiration PNA    #Septic Shock - due to UTI and PNA. Resolved  #metabolic encephalopathy - due to infections below, resolved  #UTI  #Hospital acquired pneumonia   - c/w Zosyn for complicated UTI due to Magdaleno as well as HAP/aspiration gram negative coverage.  Will cover for 7 days  - mental status back to baseline    #BPH  - resume finasteride and flomax  - removed Magdaleno and passed TOV.  OOBTC, maintain bowel regimen    #Hematuria  - likely due to magdaleno, self resolved.  Hgb stable    #CVA  - bithalamic ischemic stroke on MRI  - residual deficits, now returned to baseline  - s/p PEG and patient with dysphagia.  Passed for soft and bite size  - aspiration precautions  - PT consult recommends [facility]    #Anemia  - likely AOCD, stable Hgb and no signs of bleeding  - trend Hgb    #Transaminitis  - likely due to sepsis, improved  - RUQ with gallbladder sludge, no signs of obstruction    #Functional quadriplegia  - s/p CVA, PEG  - has HHAs, likely for [facility]    DVT ppx: Lovenox  Dispo: [facility] pending bed likely 10/14  Code status: DNR but would trial intubation

## 2022-10-13 NOTE — PROGRESS NOTE ADULT - PROBLEM SELECTOR PLAN 5
CT abdomen/pelvis on 10/9/2022 showed an enlarged prostate. Magdaleno catheter within the bladder lumen. Bladder wall thickening due to muscular hypertrophy versus cystitis. Large rectal stool. No bowel obstruction. Magdaleno catheter exchanged in ED. UA revealed + Jenny, WBC, and bacteria. Urine cx neg growth to date.   - C/w tamsulosin and finasteride home medications    - Trial of void passed on 10/11/2022   - Pt had 2 episodes of painless hematuria o/n. Suspecting magdaleno removal as cause. Pt with stable H&H, non-constant hematuria episodes that occur in intervals. Can consider cystoscopy if hematuria continues. Discussed with pt and and his son Lobo.

## 2022-10-13 NOTE — PROGRESS NOTE ADULT - ASSESSMENT
per Internal Medicine    81 y o M HLD, BPH diverticulitis, anxiety, depression with recent admission to [facility] for stroke presenting with AMS and hypotension found to be septic 2/2 aspiration pna vs uti.     Problem/Plan - 1:  ·  Problem: Metabolic encephalopathy.   ·  Plan: Pt with AMS likely 2/2 to UTI vs aspiration PNA. CT chest showing Pulmonary opacities in both lower lobes due to atelectasis/pneumonia. Pt showing dramatic improvement of mental status this AM 10/11/2022 with continued treatment with abx. Leukocytosis downtrending at this time, pt producing urine magdaleno out. Pt cleared by speech and swallow for pureed foods, though pt still remains a high aspiration risk. Currently resuming all PO medications but holding amlodipine i/s/o hypotension.   - C/w zosyn for mgmt of hospital acquired PNA and UTI   - Can c/w Zosyn at [facility] if pt's mental status improves.    Problem/Plan - 2:  ·  Problem: Urinary tract infection.   ·  Plan: Pt with magdaleno in place that was exchanged in ED on 10/9/2022. Currently being treated w/ zosyn for aspiration PNA. Leukocytosis on admission down-trending, pt more conversive. Magdaleno in place, pt producing urine. Urine cxs and blood cxs showing no growth to date.   - C/w zosyn 3.375g q8h for complicated UTI and hospital acquired pneumonia   - Mental status improving significantly 10/12/2022.    Problem/Plan - 3:  ·  Problem: Pneumonia, aspiration.   ·  Plan: CXR showing discoid change and/or infiltrate left lung base. Pt currently satting 98% on RA, currently protecting airway.   - C/w zosyn 3.375g for total of 7 days, beginning on 10/9/2022 for HAP   - Trend leukocytosis which is currently downtrending but elevated   - Continue to assess mental status daily.    Problem/Plan - 4:  ·  Problem: Dysphagia, post-stroke.   ·  Plan: Per H&P, pt initially presented to [facility] on 8/12/22 for blurry vision, found to have bithalamic ischemic stroke on MRI. His course was complicated by an in hospital fall then subsequent unresponsiveness requiring intubation and MICU care with 3 failed attempts at extubation first complicated by poor secretion management (8/16) then by stridor and airway edema (8/19). Extubated 8/24 and weaned down to NC, but then could not tolerate HFNC and was reintubated; vanc/cef/flagyl were given for possible HAP leading to extubation on 8/31, weaned to RA by 9/12. Per notes, patient suffered from persistent dysphagia requiring PEG insertion which was done on 9/8, s/p delirious PEG removal on 9/14 with successful reinsertion. On examination on [facility] on 10/11/2022, PEG site clear and without bleeding or discharge/erythema. CT chest w/ IV contrast on 10/9/2022 show percutaneous gastrostomy tube within the stomach lumen with colonic diverticulosis. Speech and swallow labels pt as high aspiration risk, but recommends pureed foods.   - Resume feeds and PO medications with strict aspiration precautions  - Pt with slight gaze nystagmus likely d/2 prior CVA, no other focal deficits   - Pt can f/u with his outpatient Neurologist and PT.    Problem/Plan - 5:  ·  Problem: BPH (benign prostatic hyperplasia).   ·  Plan: CT abdomen/pelvis on 10/9/2022 showed an enlarged prostate. Magdaleno catheter within the bladder lumen. Bladder wall thickening due to muscular hypertrophy versus cystitis. Large rectal stool. No bowel obstruction. Magdaleno catheter exchanged in ED. UA revealed + Jenny, WBC, and bacteria. Urine cx neg growth to date.   - C/w tamsulosin and finasteride home medications    - Trial of void passed yesterday 10/11/2022.    Problem/Plan - 6:  ·  Problem: Prophylactic measure.   ·  Plan: F: Replete PRN, not tolerating oral  E: Replete PRN K > 4, Mg > 2   GI: PEG in place, Protonix 40mg IV  DVT: Lovenox 40mg   N: NPO for now   Code status: Full code.

## 2022-10-13 NOTE — PATIENT PROFILE ADULT - FALL HARM RISK - HARM RISK INTERVENTIONS
Assistance with ambulation/Assistance OOB with selected safe patient handling equipment/Communicate Risk of Fall with Harm to all staff/Discuss with provider need for PT consult/Monitor gait and stability/Reinforce activity limits and safety measures with patient and family/Tailored Fall Risk Interventions/Visual Cue: Yellow wristband and red socks/Bed in lowest position, wheels locked, appropriate side rails in place/Call bell, personal items and telephone in reach/Instruct patient to call for assistance before getting out of bed or chair/Non-slip footwear when patient is out of bed/[place] to call system/Physically safe environment - no spills, clutter or unnecessary equipment/Purposeful Proactive Rounding/Room/bathroom lighting operational, light cord in reach

## 2022-10-13 NOTE — PROGRESS NOTE ADULT - SUBJECTIVE AND OBJECTIVE BOX
CC: Patient is a 81y old  Male who presents with a chief complaint of AMS 2/2 aspiration PNA vs UTI     INTERVAL HPI/OVERNIGHT EVENTS: Pt had 2 episodes of gross hematuria with about ~ 3 tsp of blood. H&H stable, no penile or testicular lesions, tenderness, or cuts. Likely due to magdaleno catheter removal, discussed suspicions with pt and pt's family     Subjective: Patient seen and examined at bedside. Denies ROS. Mentating well.   ROS: negative unless otherwise stated above.    VITAL SIGNS:  Vital Signs Last 24 Hrs  T(C): 37.3 (10 Oct 2022 09:50), Max: 37.3 (10 Oct 2022 09:50)  T(F): 99.1 (10 Oct 2022 09:50), Max: 99.1 (10 Oct 2022 09:50)  HR: 84 (10 Oct 2022 13:00) (67 - 84)  BP: 143/76 (10 Oct 2022 13:00) (85/51 - 143/76)  BP(mean): 105 (10 Oct 2022 13:00) (63 - 105)  RR: 16 (10 Oct 2022 13:00) (14 - 20)  SpO2: 95% (10 Oct 2022 13:00) (93% - 96%)    Parameters below as of 10 Oct 2022 14:00  Patient On (Oxygen Delivery Method): room air          10-09-22 @ 07:  -  10-10-22 @ 07:00  --------------------------------------------------------  IN: 1194.3 mL / OUT: 1265 mL / NET: -70.7 mL    10-10-22 @ 07:01  -  10-10-22 @ 13:52  --------------------------------------------------------  IN: 200 mL / OUT: 245 mL / NET: -45 mL        PHYSICAL EXAM:    General: NAD  HEENT: MMM  Neck: supple  Cardiovascular: +S1/S2; RRR  Respiratory: CTA B/L; no W/R/R  Gastrointestinal: soft, NT/ND  Extremities: WWP; no edema, clubbing or cyanosis  Vascular: 2+ radial, DP/PT pulses B/L  Neurological: AAOx3; no focal deficits    MEDICATIONS:  MEDICATIONS  (STANDING):  aspirin  chewable 81 milliGRAM(s) Enteral Tube daily  atorvastatin 80 milliGRAM(s) Oral every 24 hours  chlorhexidine 2% Cloths 1 Application(s) Topical <User Schedule>  enoxaparin Injectable 40 milliGRAM(s) SubCutaneous every 24 hours  norepinephrine Infusion 0.05 MICROgram(s)/kG/Min (6.97 mL/Hr) IV Continuous <Continuous>  piperacillin/tazobactam IVPB.. 3.375 Gram(s) IV Intermittent every 8 hours  sertraline 50 milliGRAM(s) Oral every 24 hours    MEDICATIONS  (PRN):      ALLERGIES:  Allergies    No Known Allergies    Intolerances        LABS:                        7.9    38.99 )-----------( 166      ( 10 Oct 2022 05:18 )             24.2     10-10    141  |  108  |  20  ----------------------------<  101<H>  3.4<L>   |  23  |  0.88    Ca    8.5      10 Oct 2022 05:18  Phos  2.6     10-10  Mg     2.0     10-10    TPro  5.3<L>  /  Alb  2.7<L>  /  TBili  0.4  /  DBili  x   /  AST  26  /  ALT  44  /  AlkPhos  108  10-10    PT/INR - ( 09 Oct 2022 00:11 )   PT: 15.5 sec;   INR: 1.30          PTT - ( 09 Oct 2022 00:11 )  PTT:34.4 sec  Urinalysis Basic - ( 09 Oct 2022 02:02 )    Color: Red / Appearance: Cloudy / S.020 / pH: x  Gluc: x / Ketone: NEGATIVE  / Bili: Negative / Urobili: 0.2 E.U./dL   Blood: x / Protein: 100 mg/dL / Nitrite: NEGATIVE   Leuk Esterase: Small / RBC: > 10 /HPF / WBC > 10 /HPF   Sq Epi: x / Non Sq Epi: 0-5 /HPF / Bacteria: Present /HPF      CAPILLARY BLOOD GLUCOSE      POCT Blood Glucose.: 102 mg/dL (10 Oct 2022 05:09)      RADIOLOGY & ADDITIONAL TESTS: Reviewed.

## 2022-10-13 NOTE — PROGRESS NOTE ADULT - PROBLEM SELECTOR PLAN 1
Pt with AMS likely 2/2 to UTI vs aspiration PNA. CT chest showing Pulmonary opacities in both lower lobes due to atelectasis/pneumonia. Pt showing dramatic improvement of mental status this AM 10/11/2022 with continued treatment with abx. Leukocytosis downtrending at this time, pt producing urine magdaleno out. Pt cleared by speech and swallow for pureed foods, though pt still remains a high aspiration risk. Currently resuming all PO medications but holding amlodipine i/s/o hypotension.   - C/w zosyn for mgmt of hospital acquired PNA and UTI   - Can c/w Zosyn at [facility] if pt's mental status improves

## 2022-10-13 NOTE — PROGRESS NOTE ADULT - PROBLEM SELECTOR PLAN 4
Per H&P, pt initially presented to [facility] on 8/12/22 for blurry vision, found to have bithalamic ischemic stroke on MRI. His course was complicated by an in hospital fall then subsequent unresponsiveness requiring intubation and MICU care with 3 failed attempts at extubation first complicated by poor secretion management (8/16) then by stridor and airway edema (8/19). Extubated 8/24 and weaned down to NC, but then could not tolerate HFNC and was reintubated; vanc/cef/flagyl were given for possible HAP leading to extubation on 8/31, weaned to RA by 9/12. Per notes, patient suffered from persistent dysphagia requiring PEG insertion which was done on 9/8, s/p delirious PEG removal on 9/14 with successful reinsertion. On examination on [facility] on 10/11/2022, PEG site clear and without bleeding or discharge/erythema. CT chest w/ IV contrast on 10/9/2022 show percutaneous gastrostomy tube within the stomach lumen with colonic diverticulosis. Speech and swallow labels pt as high aspiration risk, but recommends pureed foods. Pt now cleared for soft and bite sized diet.   - Resume feeds and PO medications with strict aspiration precautions  - Resolved gaze nystagmus, no other focal deficits   - Pt can f/u with his outpatient Neurologist and PT

## 2022-10-14 LAB
ALBUMIN SERPL ELPH-MCNC: 3.4 G/DL — SIGNIFICANT CHANGE UP (ref 3.3–5)
ALP SERPL-CCNC: 113 U/L — SIGNIFICANT CHANGE UP (ref 40–120)
ALT FLD-CCNC: 51 U/L — HIGH (ref 10–45)
ANION GAP SERPL CALC-SCNC: 9 MMOL/L — SIGNIFICANT CHANGE UP (ref 5–17)
AST SERPL-CCNC: 37 U/L — SIGNIFICANT CHANGE UP (ref 10–40)
BILIRUB SERPL-MCNC: 0.3 MG/DL — SIGNIFICANT CHANGE UP (ref 0.2–1.2)
BUN SERPL-MCNC: 13 MG/DL — SIGNIFICANT CHANGE UP (ref 7–23)
CALCIUM SERPL-MCNC: 9.2 MG/DL — SIGNIFICANT CHANGE UP (ref 8.4–10.5)
CHLORIDE SERPL-SCNC: 105 MMOL/L — SIGNIFICANT CHANGE UP (ref 96–108)
CO2 SERPL-SCNC: 25 MMOL/L — SIGNIFICANT CHANGE UP (ref 22–31)
CREAT SERPL-MCNC: 0.89 MG/DL — SIGNIFICANT CHANGE UP (ref 0.5–1.3)
CULTURE RESULTS: SIGNIFICANT CHANGE UP
EGFR: 87 ML/MIN/1.73M2 — SIGNIFICANT CHANGE UP
GLUCOSE BLDC GLUCOMTR-MCNC: 132 MG/DL — HIGH (ref 70–99)
GLUCOSE SERPL-MCNC: 136 MG/DL — HIGH (ref 70–99)
HCT VFR BLD CALC: 27.9 % — LOW (ref 39–50)
HGB BLD-MCNC: 8.8 G/DL — LOW (ref 13–17)
MAGNESIUM SERPL-MCNC: 1.8 MG/DL — SIGNIFICANT CHANGE UP (ref 1.6–2.6)
MCHC RBC-ENTMCNC: 29.5 PG — SIGNIFICANT CHANGE UP (ref 27–34)
MCHC RBC-ENTMCNC: 31.5 GM/DL — LOW (ref 32–36)
MCV RBC AUTO: 93.6 FL — SIGNIFICANT CHANGE UP (ref 80–100)
NRBC # BLD: 0 /100 WBCS — SIGNIFICANT CHANGE UP (ref 0–0)
PHOSPHATE SERPL-MCNC: 2.9 MG/DL — SIGNIFICANT CHANGE UP (ref 2.5–4.5)
PLATELET # BLD AUTO: 278 K/UL — SIGNIFICANT CHANGE UP (ref 150–400)
POTASSIUM SERPL-MCNC: 3.9 MMOL/L — SIGNIFICANT CHANGE UP (ref 3.5–5.3)
POTASSIUM SERPL-SCNC: 3.9 MMOL/L — SIGNIFICANT CHANGE UP (ref 3.5–5.3)
PROT SERPL-MCNC: 5.9 G/DL — LOW (ref 6–8.3)
RBC # BLD: 2.98 M/UL — LOW (ref 4.2–5.8)
RBC # FLD: 15.6 % — HIGH (ref 10.3–14.5)
SODIUM SERPL-SCNC: 139 MMOL/L — SIGNIFICANT CHANGE UP (ref 135–145)
SPECIMEN SOURCE: SIGNIFICANT CHANGE UP
WBC # BLD: 7.66 K/UL — SIGNIFICANT CHANGE UP (ref 3.8–10.5)
WBC # FLD AUTO: 7.66 K/UL — SIGNIFICANT CHANGE UP (ref 3.8–10.5)

## 2022-10-14 PROCEDURE — 99232 SBSQ HOSP IP/OBS MODERATE 35: CPT | Mod: GC

## 2022-10-14 RX ORDER — AMLODIPINE BESYLATE 2.5 MG/1
10 TABLET ORAL DAILY
Refills: 0 | Status: DISCONTINUED | OUTPATIENT
Start: 2022-10-14 | End: 2022-10-15

## 2022-10-14 RX ADMIN — PIPERACILLIN AND TAZOBACTAM 25 GRAM(S): 4; .5 INJECTION, POWDER, LYOPHILIZED, FOR SOLUTION INTRAVENOUS at 09:37

## 2022-10-14 RX ADMIN — PIPERACILLIN AND TAZOBACTAM 25 GRAM(S): 4; .5 INJECTION, POWDER, LYOPHILIZED, FOR SOLUTION INTRAVENOUS at 17:49

## 2022-10-14 RX ADMIN — Medication 81 MILLIGRAM(S): at 09:36

## 2022-10-14 RX ADMIN — SERTRALINE 50 MILLIGRAM(S): 25 TABLET, FILM COATED ORAL at 09:37

## 2022-10-14 RX ADMIN — ENOXAPARIN SODIUM 40 MILLIGRAM(S): 100 INJECTION SUBCUTANEOUS at 09:36

## 2022-10-14 RX ADMIN — PANTOPRAZOLE SODIUM 40 MILLIGRAM(S): 20 TABLET, DELAYED RELEASE ORAL at 09:36

## 2022-10-14 RX ADMIN — FINASTERIDE 5 MILLIGRAM(S): 5 TABLET, FILM COATED ORAL at 09:36

## 2022-10-14 RX ADMIN — AMLODIPINE BESYLATE 10 MILLIGRAM(S): 2.5 TABLET ORAL at 17:49

## 2022-10-14 RX ADMIN — PIPERACILLIN AND TAZOBACTAM 25 GRAM(S): 4; .5 INJECTION, POWDER, LYOPHILIZED, FOR SOLUTION INTRAVENOUS at 00:58

## 2022-10-14 NOTE — PROGRESS NOTE ADULT - PROBLEM SELECTOR PLAN 4
Per H&P, pt initially presented to [facility] on 8/12/22 for blurry vision, found to have bithalamic ischemic stroke on MRI. His course was complicated by an in hospital fall then subsequent unresponsiveness requiring intubation and MICU care with 3 failed attempts at extubation first complicated by poor secretion management (8/16) then by stridor and airway edema (8/19). Extubated 8/24 and weaned down to NC, but then could not tolerate HFNC and was reintubated; vanc/cef/flagyl were given for possible HAP leading to extubation on 8/31, weaned to RA by 9/12. Per notes, patient suffered from persistent dysphagia requiring PEG insertion which was done on 9/8, s/p delirious PEG removal on 9/14 with successful reinsertion. On examination on [facility] on 10/11/2022, PEG site clear and without bleeding or discharge/erythema. CT chest w/ IV contrast on 10/9/2022 show percutaneous gastrostomy tube within the stomach lumen with colonic diverticulosis. Speech and swallow labels pt as high aspiration risk, but recommends pureed foods. Pt now cleared for soft and bite sized diet.   - Resume feeds and PO medications with strict aspiration precautions  - Resolved gaze nystagmus, no other focal deficits   - Pt can f/u with his outpatient Neurologist and PT Per H&P, pt initially presented to [facility] on 8/12/22 for blurry vision, found to have bithalamic ischemic stroke on MRI. His course was complicated by an in hospital fall then subsequent unresponsiveness requiring intubation and MICU care with 3 failed attempts at extubation first complicated by poor secretion management (8/16) then by stridor and airway edema (8/19). Extubated 8/24 and weaned down to NC, but then could not tolerate HFNC and was reintubated; vanc/cef/flagyl were given for possible HAP leading to extubation on 8/31, weaned to RA by 9/12. Per notes, patient suffered from persistent dysphagia requiring PEG insertion which was done on 9/8, s/p delirious PEG removal on 9/14 with successful reinsertion. On examination on [facility] on 10/11/2022, PEG site clear and without bleeding or discharge/erythema. CT chest w/ IV contrast on 10/9/2022 show percutaneous gastrostomy tube within the stomach lumen with colonic diverticulosis. Speech and swallow labels pt as high aspiration risk, but recommends pureed foods. Pt cleared for soft and bite sized diet, may advance to regular as tolerated.   - Resume feeds and PO medications  - Resolved gaze nystagmus, no other focal deficits   - Pt can f/u with his outpatient Neurologist and PT

## 2022-10-14 NOTE — PROGRESS NOTE ADULT - ASSESSMENT
Patient is a 82 y/o M HLD, BPH diverticulitis, anxiety, depression with recent admission to [facility] for stroke presenting with AMS and hypotension found to be septic 2/2 aspiration pna vs uti.

## 2022-10-14 NOTE — PROGRESS NOTE ADULT - PROBLEM SELECTOR PLAN 5
CT abdomen/pelvis on 10/9/2022 showed an enlarged prostate. Magdaleno catheter within the bladder lumen. Bladder wall thickening due to muscular hypertrophy versus cystitis. Large rectal stool. No bowel obstruction. Magdaleno catheter exchanged in ED. UA revealed + Jenny, WBC, and bacteria. Urine cx neg growth to date.   - C/w tamsulosin and finasteride home medications    - Trial of void passed on 10/11/2022   - Pt had 2 episodes of painless hematuria o/n. Suspecting magdaleno removal as cause. Pt with stable H&H, non-constant hematuria episodes that occur in intervals. Can consider cystoscopy if hematuria continues. Discussed with pt and and his son Lobo. CT abdomen/pelvis on 10/9/2022 showed an enlarged prostate. Magdaleno catheter within the bladder lumen. Bladder wall thickening due to muscular hypertrophy versus cystitis. Large rectal stool. No bowel obstruction. Magdaleno catheter exchanged in ED. UA revealed + Jenny, WBC, and bacteria. Urine cx neg growth to date.   - C/w tamsulosin and finasteride home medications    - Trial of void passed on 10/11/2022   - Pt had 2 episodes of painless hematuria which seems to have resolved 10/14/2022. Suspecting magdaleno removal as cause. Pt with stable H&H, non-constant hematuria episodes that occur in intervals. Can consider cystoscopy if hematuria continues. Discussed with pt and and his son Lobo.

## 2022-10-14 NOTE — PROGRESS NOTE ADULT - ATTENDING COMMENTS
80 y/o M HLD, BPH requiring chronic magdaleno, diverticulitis, anxiety, depression with recent admission to [facility] for stroke (39 day admission, c/b multiple intubations, dysphagia and PEG tube placement, d/c to nursing home in stable condition) who presented with septic shock and metabolic encephalopathy likely due to complicated UTI and aspiration PNA    #Septic Shock - due to UTI and PNA. Resolved  #metabolic encephalopathy - due to infections below, resolved  #UTI  #Hospital acquired pneumonia   - c/w Zosyn for complicated UTI due to Magdaleno as well as HAP/aspiration gram negative coverage.  Will cover for 7 days. EOT 10/15  - mental status back to baseline    #BPH  - resume finasteride and flomax  - removed Magdaleno and passed TOV.  OOBTC, maintain bowel regimen    #Hematuria  - likely due to magdaleno, self resolved.  Hgb stable    #CVA  - bithalamic ischemic stroke on MRI  - residual deficits, now returned to baseline  - s/p PEG and patient with dysphagia.  Passed for regular diet from soft and bite sized  - aspiration precautions  - PT consult recommends [facility]    #Anemia  - likely AOCD, stable Hgb and no signs of bleeding  - trend Hgb    #Transaminitis  - likely due to sepsis, improved  - RUQ with gallbladder sludge, no signs of obstruction    #Functional quadriplegia  - s/p CVA, PEG  - has HHAs, rec for [facility]    DVT ppx: Lovenox  Dispo: [facility] pending bed likely 10/15  Code status: DNR but would trial intubation

## 2022-10-14 NOTE — PROGRESS NOTE ADULT - PROBLEM SELECTOR PLAN 2
Pt with magdaleno in place that was exchanged in ED on 10/9/2022. Currently being treated w/ zosyn for aspiration PNA. Leukocytosis on admission down-trending, pt more conversive. Magdaleno in place, pt producing urine. Urine cxs and blood cxs showing no growth to date.   - C/w zosyn 3.375g q8h for complicated UTI and hospital acquired pneumonia   - Mental status improving significantly 10/13/2022 Pt with magdaleno in place that was exchanged in ED on 10/9/2022. Currently being treated w/ zosyn for aspiration PNA. Leukocytosis on admission down-trending, pt more conversive. Magdaleno in place, pt producing urine. Urine cxs and blood cxs showing no growth to date.   - C/w zosyn 3.375g q8h for complicated UTI and hospital acquired pneumonia   - Mental status improving significantly

## 2022-10-14 NOTE — PROGRESS NOTE ADULT - REASON FOR ADMISSION
Septic Shock

## 2022-10-14 NOTE — PROGRESS NOTE ADULT - PROVIDER SPECIALTY LIST ADULT
MICU
MICU
Rehab Medicine
Internal Medicine

## 2022-10-14 NOTE — PROGRESS NOTE ADULT - PROBLEM SELECTOR PLAN 3
CXR showing discoid change and/or infiltrate left lung base. Pt currently satting 98% on RA, currently protecting airway.   - C/w zosyn 3.375g for total of 7 days, beginning on 10/9/2022 for HAP   - Trend leukocytosis which is currently downtrending but elevated   - Continue to assess mental status daily CXR showing discoid change and/or infiltrate left lung base. Pt currently satting 98% on RA, currently protecting airway. ----- RESOLVED   - C/w zosyn 3.375g for total of 7 days, beginning on 10/9/2022 for HAP   - Leukocytosis corrected

## 2022-10-14 NOTE — PROGRESS NOTE ADULT - PROBLEM SELECTOR PLAN 6
F: Replete PRN, not tolerating oral  E: Replete PRN K > 4, Mg > 2   GI: PEG in place, Protonix 40mg IV  DVT: Lovenox 40mg   N: NPO for now   Code status: Full code
F: Replete PRN, not tolerating oral  E: Replete PRN K > 4, Mg > 2   GI: PEG in place, Protonix 40mg IV  DVT: Lovenox 40mg   N: NPO for now   Code status: Full code
F: Replete PRN  E: Replete PRN K > 4, Mg > 2   GI: PEG in place, Protonix 40mg IV  DVT: Lovenox 40mg   N: Soft and bite sized   Code status: Full code
F: Replete PRN, not tolerating oral  E: Replete PRN K > 4, Mg > 2   GI: PEG in place, Protonix 40mg IV  DVT: Lovenox 40mg   N: NPO for now   Code status: Full code
F: Replete PRN  E: Replete PRN K > 4, Mg > 2   GI: PEG in place, Protonix 40mg IV  DVT: Lovenox 40mg   N: Soft and bite sized   Code status: Full code

## 2022-10-14 NOTE — PROGRESS NOTE ADULT - PROBLEM SELECTOR PLAN 1
Pt with AMS likely 2/2 to UTI vs aspiration PNA. CT chest showing Pulmonary opacities in both lower lobes due to atelectasis/pneumonia. Pt showing dramatic improvement of mental status this AM 10/11/2022 with continued treatment with abx. Leukocytosis downtrending at this time, pt producing urine magdaleno out. Pt cleared by speech and swallow for pureed foods, though pt still remains a high aspiration risk. Currently resuming all PO medications but holding amlodipine i/s/o hypotension.   - C/w zosyn for mgmt of hospital acquired PNA and UTI   - Can c/w Zosyn at [facility] if pt's mental status improves Pt with AMS likely 2/2 to UTI vs aspiration PNA. CT chest showing Pulmonary opacities in both lower lobes due to atelectasis/pneumonia. Pt with improved speech quality throughout admission with continual abx treatment. Leukocytosis downtrending and normal this AM. Pt producing urine through magdaleno catheter. Pt cleared by speech and swallow for soft and bite sized diet and can advance as tolerated. Currently resuming all PO medications but holding amlodipine i/s/o hypotension.   - C/w zosyn for mgmt of hospital acquired PNA and UTI   - Pt to be d/c to [facility] today 10/14/2022.

## 2022-10-14 NOTE — PROGRESS NOTE ADULT - SUBJECTIVE AND OBJECTIVE BOX
CC: Patient is a 81y old  Male who presents with a chief complaint of AMS 2/2 aspiration PNA vs UTI     INTERVAL HPI/OVERNIGHT EVENTS: Pt had 2 episodes of gross hematuria with about ~ 3 tsp of blood. H&H stable, no penile or testicular lesions, tenderness, or cuts. Likely due to magdaleno catheter removal, discussed suspicions with pt and pt's family     Subjective: Patient seen and examined at bedside. Denies ROS. Mentating well.   ROS: negative unless otherwise stated above.    VITAL SIGNS:  Vital Signs Last 24 Hrs  T(C): 37.3 (10 Oct 2022 09:50), Max: 37.3 (10 Oct 2022 09:50)  T(F): 99.1 (10 Oct 2022 09:50), Max: 99.1 (10 Oct 2022 09:50)  HR: 84 (10 Oct 2022 13:00) (67 - 84)  BP: 143/76 (10 Oct 2022 13:00) (85/51 - 143/76)  BP(mean): 105 (10 Oct 2022 13:00) (63 - 105)  RR: 16 (10 Oct 2022 13:00) (14 - 20)  SpO2: 95% (10 Oct 2022 13:00) (93% - 96%)    Parameters below as of 10 Oct 2022 14:00  Patient On (Oxygen Delivery Method): room air          10-09-22 @ 07:  -  10-10-22 @ 07:00  --------------------------------------------------------  IN: 1194.3 mL / OUT: 1265 mL / NET: -70.7 mL    10-10-22 @ 07:01  -  10-10-22 @ 13:52  --------------------------------------------------------  IN: 200 mL / OUT: 245 mL / NET: -45 mL        PHYSICAL EXAM:    General: NAD  HEENT: MMM  Neck: supple  Cardiovascular: +S1/S2; RRR  Respiratory: CTA B/L; no W/R/R  Gastrointestinal: soft, NT/ND  Extremities: WWP; no edema, clubbing or cyanosis  Vascular: 2+ radial, DP/PT pulses B/L  Neurological: AAOx3; no focal deficits    MEDICATIONS:  MEDICATIONS  (STANDING):  aspirin  chewable 81 milliGRAM(s) Enteral Tube daily  atorvastatin 80 milliGRAM(s) Oral every 24 hours  chlorhexidine 2% Cloths 1 Application(s) Topical <User Schedule>  enoxaparin Injectable 40 milliGRAM(s) SubCutaneous every 24 hours  norepinephrine Infusion 0.05 MICROgram(s)/kG/Min (6.97 mL/Hr) IV Continuous <Continuous>  piperacillin/tazobactam IVPB.. 3.375 Gram(s) IV Intermittent every 8 hours  sertraline 50 milliGRAM(s) Oral every 24 hours    MEDICATIONS  (PRN):      ALLERGIES:  Allergies    No Known Allergies    Intolerances        LABS:                        7.9    38.99 )-----------( 166      ( 10 Oct 2022 05:18 )             24.2     10-10    141  |  108  |  20  ----------------------------<  101<H>  3.4<L>   |  23  |  0.88    Ca    8.5      10 Oct 2022 05:18  Phos  2.6     10-10  Mg     2.0     10-10    TPro  5.3<L>  /  Alb  2.7<L>  /  TBili  0.4  /  DBili  x   /  AST  26  /  ALT  44  /  AlkPhos  108  10-10    PT/INR - ( 09 Oct 2022 00:11 )   PT: 15.5 sec;   INR: 1.30          PTT - ( 09 Oct 2022 00:11 )  PTT:34.4 sec  Urinalysis Basic - ( 09 Oct 2022 02:02 )    Color: Red / Appearance: Cloudy / S.020 / pH: x  Gluc: x / Ketone: NEGATIVE  / Bili: Negative / Urobili: 0.2 E.U./dL   Blood: x / Protein: 100 mg/dL / Nitrite: NEGATIVE   Leuk Esterase: Small / RBC: > 10 /HPF / WBC > 10 /HPF   Sq Epi: x / Non Sq Epi: 0-5 /HPF / Bacteria: Present /HPF      CAPILLARY BLOOD GLUCOSE      POCT Blood Glucose.: 102 mg/dL (10 Oct 2022 05:09)      RADIOLOGY & ADDITIONAL TESTS: Reviewed. CC: Patient is a 81y old  Male who presents with a chief complaint of AMS 2/2 aspiration PNA vs UTI     INTERVAL HPI/OVERNIGHT EVENTS: Pt no longer with hematuria passing through condom cath. Per aid, pt had removed his condom cath several times o/n, but otherwise did not have any events.     Subjective: Patient seen and examined at bedside. No new complaints.  ROS: negative unless otherwise stated above.    VITAL SIGNS:  Vital Signs Last 24 Hrs  T(C): 37.3 (10 Oct 2022 09:50), Max: 37.3 (10 Oct 2022 09:50)  T(F): 99.1 (10 Oct 2022 09:50), Max: 99.1 (10 Oct 2022 09:50)  HR: 84 (10 Oct 2022 13:00) (67 - 84)  BP: 143/76 (10 Oct 2022 13:00) (85/51 - 143/76)  BP(mean): 105 (10 Oct 2022 13:00) (63 - 105)  RR: 16 (10 Oct 2022 13:00) (14 - 20)  SpO2: 95% (10 Oct 2022 13:00) (93% - 96%)    Parameters below as of 10 Oct 2022 14:00  Patient On (Oxygen Delivery Method): room air          10-09-22 @ 07:  -  10-10-22 @ 07:00  --------------------------------------------------------  IN: 1194.3 mL / OUT: 1265 mL / NET: -70.7 mL    10-10-22 @ 07:01  -  10-10-22 @ 13:52  --------------------------------------------------------  IN: 200 mL / OUT: 245 mL / NET: -45 mL        PHYSICAL EXAM:    General: NAD  HEENT: MMM  Neck: supple  Cardiovascular: +S1/S2; RRR  Respiratory: CTA B/L; no W/R/R  Gastrointestinal: soft, NT/ND  Extremities: WWP; no edema, clubbing or cyanosis  Vascular: 2+ radial, DP/PT pulses B/L  Neurological: AAOx3; no focal deficits  : No penile discharge, erosions, lesions.     MEDICATIONS:  MEDICATIONS  (STANDING):  aspirin  chewable 81 milliGRAM(s) Enteral Tube daily  atorvastatin 80 milliGRAM(s) Oral every 24 hours  chlorhexidine 2% Cloths 1 Application(s) Topical <User Schedule>  enoxaparin Injectable 40 milliGRAM(s) SubCutaneous every 24 hours  norepinephrine Infusion 0.05 MICROgram(s)/kG/Min (6.97 mL/Hr) IV Continuous <Continuous>  piperacillin/tazobactam IVPB.. 3.375 Gram(s) IV Intermittent every 8 hours  sertraline 50 milliGRAM(s) Oral every 24 hours    MEDICATIONS  (PRN):      ALLERGIES:  Allergies    No Known Allergies    Intolerances        LABS:                        7.9    38.99 )-----------( 166      ( 10 Oct 2022 05:18 )             24.2     10-10    141  |  108  |  20  ----------------------------<  101<H>  3.4<L>   |  23  |  0.88    Ca    8.5      10 Oct 2022 05:18  Phos  2.6     10-10  Mg     2.0     10-10    TPro  5.3<L>  /  Alb  2.7<L>  /  TBili  0.4  /  DBili  x   /  AST  26  /  ALT  44  /  AlkPhos  108  10-10    PT/INR - ( 09 Oct 2022 00:11 )   PT: 15.5 sec;   INR: 1.30          PTT - ( 09 Oct 2022 00:11 )  PTT:34.4 sec  Urinalysis Basic - ( 09 Oct 2022 02:02 )    Color: Red / Appearance: Cloudy / S.020 / pH: x  Gluc: x / Ketone: NEGATIVE  / Bili: Negative / Urobili: 0.2 E.U./dL   Blood: x / Protein: 100 mg/dL / Nitrite: NEGATIVE   Leuk Esterase: Small / RBC: > 10 /HPF / WBC > 10 /HPF   Sq Epi: x / Non Sq Epi: 0-5 /HPF / Bacteria: Present /HPF      CAPILLARY BLOOD GLUCOSE      POCT Blood Glucose.: 102 mg/dL (10 Oct 2022 05:09)      RADIOLOGY & ADDITIONAL TESTS: Reviewed.

## 2022-10-14 NOTE — PROGRESS NOTE ADULT - PROBLEM SELECTOR PROBLEM 4
Dysphagia, post-stroke

## 2022-10-15 ENCOUNTER — TRANSCRIPTION ENCOUNTER (OUTPATIENT)
Age: 82
End: 2022-10-15

## 2022-10-15 VITALS
HEART RATE: 80 BPM | SYSTOLIC BLOOD PRESSURE: 133 MMHG | TEMPERATURE: 99 F | DIASTOLIC BLOOD PRESSURE: 79 MMHG | OXYGEN SATURATION: 94 % | RESPIRATION RATE: 17 BRPM

## 2022-10-15 PROCEDURE — 85027 COMPLETE CBC AUTOMATED: CPT

## 2022-10-15 PROCEDURE — 87635 SARS-COV-2 COVID-19 AMP PRB: CPT

## 2022-10-15 PROCEDURE — 86900 BLOOD TYPING SEROLOGIC ABO: CPT

## 2022-10-15 PROCEDURE — 82330 ASSAY OF CALCIUM: CPT

## 2022-10-15 PROCEDURE — 84295 ASSAY OF SERUM SODIUM: CPT

## 2022-10-15 PROCEDURE — 96365 THER/PROPH/DIAG IV INF INIT: CPT

## 2022-10-15 PROCEDURE — 82550 ASSAY OF CK (CPK): CPT

## 2022-10-15 PROCEDURE — 71260 CT THORAX DX C+: CPT | Mod: MA

## 2022-10-15 PROCEDURE — 87040 BLOOD CULTURE FOR BACTERIA: CPT

## 2022-10-15 PROCEDURE — 76705 ECHO EXAM OF ABDOMEN: CPT

## 2022-10-15 PROCEDURE — 81001 URINALYSIS AUTO W/SCOPE: CPT

## 2022-10-15 PROCEDURE — 99291 CRITICAL CARE FIRST HOUR: CPT

## 2022-10-15 PROCEDURE — 99239 HOSP IP/OBS DSCHRG MGMT >30: CPT

## 2022-10-15 PROCEDURE — 92526 ORAL FUNCTION THERAPY: CPT

## 2022-10-15 PROCEDURE — 80076 HEPATIC FUNCTION PANEL: CPT

## 2022-10-15 PROCEDURE — 83735 ASSAY OF MAGNESIUM: CPT

## 2022-10-15 PROCEDURE — 85610 PROTHROMBIN TIME: CPT

## 2022-10-15 PROCEDURE — 84100 ASSAY OF PHOSPHORUS: CPT

## 2022-10-15 PROCEDURE — 86901 BLOOD TYPING SEROLOGIC RH(D): CPT

## 2022-10-15 PROCEDURE — 80048 BASIC METABOLIC PNL TOTAL CA: CPT

## 2022-10-15 PROCEDURE — 82962 GLUCOSE BLOOD TEST: CPT

## 2022-10-15 PROCEDURE — 80053 COMPREHEN METABOLIC PANEL: CPT

## 2022-10-15 PROCEDURE — 90662 IIV NO PRSV INCREASED AG IM: CPT

## 2022-10-15 PROCEDURE — 70450 CT HEAD/BRAIN W/O DYE: CPT | Mod: MA

## 2022-10-15 PROCEDURE — 96368 THER/DIAG CONCURRENT INF: CPT

## 2022-10-15 PROCEDURE — 87086 URINE CULTURE/COLONY COUNT: CPT

## 2022-10-15 PROCEDURE — 74177 CT ABD & PELVIS W/CONTRAST: CPT | Mod: MA

## 2022-10-15 PROCEDURE — 96366 THER/PROPH/DIAG IV INF ADDON: CPT

## 2022-10-15 PROCEDURE — 92610 EVALUATE SWALLOWING FUNCTION: CPT

## 2022-10-15 PROCEDURE — 71045 X-RAY EXAM CHEST 1 VIEW: CPT

## 2022-10-15 PROCEDURE — 82553 CREATINE MB FRACTION: CPT

## 2022-10-15 PROCEDURE — 85025 COMPLETE CBC W/AUTO DIFF WBC: CPT

## 2022-10-15 PROCEDURE — 36415 COLL VENOUS BLD VENIPUNCTURE: CPT

## 2022-10-15 PROCEDURE — 83605 ASSAY OF LACTIC ACID: CPT

## 2022-10-15 PROCEDURE — 86850 RBC ANTIBODY SCREEN: CPT

## 2022-10-15 PROCEDURE — 84484 ASSAY OF TROPONIN QUANT: CPT

## 2022-10-15 PROCEDURE — 97530 THERAPEUTIC ACTIVITIES: CPT

## 2022-10-15 PROCEDURE — 85730 THROMBOPLASTIN TIME PARTIAL: CPT

## 2022-10-15 PROCEDURE — 82803 BLOOD GASES ANY COMBINATION: CPT

## 2022-10-15 PROCEDURE — 97110 THERAPEUTIC EXERCISES: CPT

## 2022-10-15 PROCEDURE — 97162 PT EVAL MOD COMPLEX 30 MIN: CPT

## 2022-10-15 PROCEDURE — 97161 PT EVAL LOW COMPLEX 20 MIN: CPT

## 2022-10-15 PROCEDURE — 84132 ASSAY OF SERUM POTASSIUM: CPT

## 2022-10-15 RX ORDER — PIPERACILLIN AND TAZOBACTAM 4; .5 G/20ML; G/20ML
3.38 INJECTION, POWDER, LYOPHILIZED, FOR SOLUTION INTRAVENOUS
Qty: 0 | Refills: 0 | DISCHARGE
Start: 2022-10-15

## 2022-10-15 RX ORDER — ENOXAPARIN SODIUM 100 MG/ML
40 INJECTION SUBCUTANEOUS
Qty: 0 | Refills: 0 | DISCHARGE
Start: 2022-10-15

## 2022-10-15 RX ADMIN — FINASTERIDE 5 MILLIGRAM(S): 5 TABLET, FILM COATED ORAL at 11:16

## 2022-10-15 RX ADMIN — AMLODIPINE BESYLATE 10 MILLIGRAM(S): 2.5 TABLET ORAL at 07:15

## 2022-10-15 RX ADMIN — PANTOPRAZOLE SODIUM 40 MILLIGRAM(S): 20 TABLET, DELAYED RELEASE ORAL at 11:16

## 2022-10-15 RX ADMIN — ENOXAPARIN SODIUM 40 MILLIGRAM(S): 100 INJECTION SUBCUTANEOUS at 10:00

## 2022-10-15 RX ADMIN — PIPERACILLIN AND TAZOBACTAM 25 GRAM(S): 4; .5 INJECTION, POWDER, LYOPHILIZED, FOR SOLUTION INTRAVENOUS at 09:19

## 2022-10-15 RX ADMIN — PIPERACILLIN AND TAZOBACTAM 25 GRAM(S): 4; .5 INJECTION, POWDER, LYOPHILIZED, FOR SOLUTION INTRAVENOUS at 00:42

## 2022-10-15 RX ADMIN — Medication 81 MILLIGRAM(S): at 11:17

## 2022-10-15 RX ADMIN — SERTRALINE 50 MILLIGRAM(S): 25 TABLET, FILM COATED ORAL at 11:16

## 2022-10-15 NOTE — DISCHARGE NOTE NURSING/CASE MANAGEMENT/SOCIAL WORK - PATIENT PORTAL LINK FT
You can access the FollowMyHealth Patient Portal offered by [facility] by registering at the following website: [email or web address]. By joining [employer]’s FollowMyHealth portal, you will also be able to view your health information using other applications (apps) compatible with our system.

## 2022-10-15 NOTE — DISCHARGE NOTE NURSING/CASE MANAGEMENT/SOCIAL WORK - NSDCVIVACCINE_GEN_ALL_CORE_FT
influenza, high-dose, quadrivalent; 13-Oct-2022 16:25; Jessica Jacob ([employer]); [employer]; [id number] (Exp. Date: 30-Jun-2023); IntraMuscular; Deltoid Right.; 0.7 milliLiter(s); VIS (VIS Published: 06-Aug-2021, VIS Presented: 13-Oct-2022);

## 2022-10-15 NOTE — DISCHARGE NOTE NURSING/CASE MANAGEMENT/SOCIAL WORK - NSDCPEFALRISK_GEN_ALL_CORE
For information on Fall & Injury Prevention, visit: [email or web address] OR  [email or web address] OR  https://www.cdc.gov/steadi/patient.html

## 2022-10-15 NOTE — DISCHARGE NOTE NURSING/CASE MANAGEMENT/SOCIAL WORK - NSDCFUADDAPPT_GEN_ALL_CORE_FT
Please follow up with your primary care physician and neurologist to discuss your recent hospitalization and for further evaluation and management.

## 2022-10-21 NOTE — OCCUPATIONAL THERAPY INITIAL EVALUATION ADULT - FINE MOTOR COORDINATION, LEFT HAND THUMB/FINGER OPPOSITION SKILLS, OT EVAL
[facility] Medicine Wards Progress Note     Resident Team: [facility] Medicine List 2  Attending Physician: Bridger Lott MD  Resident: Lisandra/Ania  Intern: Festus/Kasia       Subjective:      Brief HPI:  Josafat Boudreaux is a 58 y.o. 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 Admitted 10/18 for IV abx, ENT following.    Interval History: NAEO. Patient is with ENT this morning undergoing a washout. Patient is now on Zosyn for pseudomonal coverage. Will continue to monitor post-op.      Review of Systems:  ROS completed and negative except as indicated above.     Objective:     Last 24 Hour Vital Signs:  BP  Min: 105/67  Max: 132/75  Temp  Av.2 °F (36.8 °C)  Min: 97.9 °F (36.6 °C)  Max: 98.9 °F (37.2 °C)  Pulse  Av.7  Min: 54  Max: 109  Resp  Av.8  Min: 16  Max: 20  SpO2  Av %  Min: 92 %  Max: 100 %  I/O last 3 completed shifts:  In: 310 [IV Piggyback:310]  Out: 725 [Urine:725]    Physical Examination:  General: well-developed well-nourished in no acute distress  Eye: PERRLA, EOMI, clear conjunctiva, eyelids normal  HENT: NC/AT, moist mucus membranes, drain now in place anterior neck  Neck: full range of motion, no thyromegaly or lymphadenopathy  Respiratory: clear to auscultation bilaterally, respirations non-labored, laryngectomy valve in place to room air  Cardiovascular: regular rate and rhythm without murmurs, gallops or rubs  Gastrointestinal: soft, non-tender, non-distended with normal bowel sounds, without masses to palpation, PEG clamped  Musculoskeletal: no obvious deformities, full range of motion of all extremities/spine without limitation or discomfort  Integumentary:  Erythema to right side neck under mandible is improved- slightly fluctuant with moderate induration and drainage to inferior/medial aspect of abscess with purulent white drainage  Extremities: radial and DP pulses 2+ bilaterally, no LE edema  Neurologic: CN II-XII intact, no signs of peripheral neurological deficit, motor/sensory function intact    Laboratory:  Most Recent Data:  CBC:   Lab Results   Component Value Date    WBC 4.7 10/20/2022    HGB 10.3 (L) 10/20/2022    HCT 31.6 (L) 10/20/2022     10/20/2022    .3 (H) 10/20/2022    RDW 15.9 10/20/2022     WBC Differential:   Recent Labs   Lab 10/18/22  1012 10/18/22  1210 10/19/22  0653 10/20/22  0420   WBC 18.3* 17.8* 8.3 4.7   HGB 11.3* 10.3* 11.0* 10.3*   HCT 34.3* 29.9* 33.4* 31.6*    260 232 258   .1* 96.8* 101.5* 101.3*       BMP:   Lab Results   Component Value Date     10/20/2022    K 4.1 10/20/2022    CO2 31 (H) 10/20/2022    BUN 10.3 10/20/2022    CREATININE 0.67 (L) 10/20/2022    CALCIUM 9.2 10/20/2022    MG 2.20 10/19/2022    PHOS 4.4 10/19/2022     LFTs:   Lab Results   Component Value Date    ALBUMIN 2.8 (L) 10/20/2022    BILITOT 0.4 10/20/2022    AST 12 10/20/2022    ALKPHOS 57 10/20/2022    ALT 10 10/20/2022     Coags:   Lab Results   Component Value Date    INR 0.99 06/26/2022    PROTIME 13.0 06/26/2022    PTT 30.6 06/26/2022     FLP:   Lab Results   Component Value Date    CHOL 227 (H) 09/16/2019    HDL 59 09/16/2019    TRIG 184 (H) 09/16/2019     DM:   Lab Results   Component Value Date    CREATININE 0.67 (L) 10/20/2022     Thyroid:   Lab Results   Component Value Date    TSH 2.1169 07/11/2022     Anemia:   Lab Results   Component Value Date    MUABBVIL34 563 06/17/2022    FOLATE 15.5 06/17/2022     Cardiac:   Lab Results   Component Value Date    TROPONINI <0.010 06/25/2022    BNP 37.4 06/26/2022       Microbiology Data Reviewed: yes  Pertinent Findings:  Pending bld cx    Other Results:  EKG (my  interpretation): no new EKG.    Radiology:  Imaging Results              CT Soft Tissue Neck WO Contrast (Final result)  Result time 10/18/22 13:01:40      Final result by Gela Liao MD (10/18/22 13:01:40)                   Impression:      Collection of mostly air and small amount of fluid in the right anterior neck with extensive soft tissue swelling.      Electronically signed by: Gela Liao  Date:    10/18/2022  Time:    13:01               Narrative:    EXAMINATION:  CT SOFT TISSUE NECK WITHOUT CONTRAST    CLINICAL HISTORY:  Neck abscess, deep tissue;laryngeal cancer s/p chemoradiation, right jaw swelling with drainage;    TECHNIQUE:  Axial CT images of the neck were obtained without intravenous contrast. Reformatted images in the sagittal and coronal plane were included.    Automatic exposure control was utilized to limit radiation dose.    DLP: 882 mGy-cm    COMPARISON:  PET-CT dated 10/13/2022    FINDINGS:  There are postoperative changes of prior total laryngectomy.  Tracheostomy is in place. There is a collection of mostly air and small amount of fluid in the right anterior neck (series 2, images 77 through 82).  Small amount of fluid measures approximately 9 x 11 mm in size (series 2, image 77).    There is soft tissue swelling in the neck bilaterally, right greater than left.  The parotid glands, submandibular glands and thyroid gland are unremarkable.  There is no acute abnormality of the orbits or visualized brain parenchyma.  There is no destructive bone lesion.  Right hilar nodularity and mediastinal lymphadenopathy are redemonstrated.                                       X-Ray Chest AP Portable (Final result)  Result time 10/18/22 12:28:47      Final result by Gela Liao MD (10/18/22 12:28:47)                   Impression:      No acute abnormality of the chest.      Electronically signed by: Gela Liao  Date:    10/18/2022  Time:    12:28               Narrative:     EXAMINATION:  XR CHEST AP PORTABLE    CLINICAL HISTORY:  Sepsis;    COMPARISON:  X-ray dated 07/11/2022    FINDINGS:  Left chest wall port catheter has its tip over the superior vena cava.  The heart is normal in size.  The lungs are clear without focal consolidation.  There is no pleural effusion or visible pneumothorax.                                      Current Medications:     Infusions:   lactated ringers Stopped (10/21/22 0852)        Scheduled:   budesonide  0.5 mg Nebulization Q12H    enoxaparin  40 mg Subcutaneous Daily    famotidine  20 mg Per G Tube BID    hydrocortisone   Topical (Top) BID    midazolam        piperacillin-tazobactam (ZOSYN) IVPB  4.5 g Intravenous Q8H        PRN:  albuterol-ipratropium, dextrose 10%, dextrose 10%, glucagon (human recombinant), glucose, glucose, guaiFENesin, hydrocodone-apap 7.5-325 MG/15 ML, hydrOXYzine pamoate, ibuprofen, naloxone, sodium chloride 0.9%    Antibiotics and Day Number of Therapy:  Unasyn D3    Lines and Day Number of Therapy:  PIV D3    Assessment & Plan:   Sepsis secondary to Abscess of Right Side Neck       - SIRS 2/4 (WBC and HR) with known source- right neck abscess draining spontaneously on admission. Currently 1/4 with leukocytosis resolved; family states this is about his baseline HR though       - Swelling started 10/15       - s/p 3 g Unasyn in ED; s/p 30 cc/kg IVF bolus, BP stable and skin exam WNL/mucosal membranes moist at this time       - Bld cultures pending x2       - Started on Zosyn 4.5g q8h (day 1)       - ENT will take for washout today       - Lortab elixer for pain q8h, benadryl for itching     Laryngeal Squamous Cell Ca  S/P Radical Neck and Laryngectomy       - Radical Neck w/trach on 7/11/22, follows up with ENT at [facility]       - Also follows with Onc, Dr. Atkinson- undergoing chemo and radiation, last dose of both around 3 weeks ago with further plans for chemo       - Imaging showed tracking of abscess to trachea so trach w/ cuff  placed to help prevent drainage from entering lungs/causing pneumonia       - ENT on board, appreciate recs     Maculopapular rash to b/l UE       - Began yesterday at oncology appointment       - Maculopapular, pruritic rash to bilateral UE hands and forearms       - Possibly urticaria vs infectious etiology; pt on Unasyn which does cover MSSA- will observe for improvement, if not may need to expand coverage       - Bendadryl q8h PRN itching    PEG Dependent       - S/p PEG with laryngectomy       - Weight stable        - TF at goal; NPO at [facility]     COPD       - No PFT's on file, not on controller inhalers       - Continue home Pulmicort nebs BID, duonebs Q6H PRN SOB/Wheezing       - Will give O2 to keep sats 88-92% if needed     CODE STATUS: Full  Access: PIV  Antibiotics: Zosyn 4.5g q8h (day 1)  Diet: Bolus TF  DVT Prophylaxis: Lovenox  GI Prophylaxis: Pepcid  Fluids: none     Disposition: Patient in OR with morning for washout by ENT. Morning labs are pending. Started on Zosyn 4.5g q8h. Blood cultures show no growth at 48 hours.          Tony Jorge, DO  [facility] Internal Medicine HO-III     normal performance

## 2022-11-28 NOTE — ED PROVIDER NOTE - OBJECTIVE STATEMENT
81M from [facility] PMH HLD, BPH, anxiety/depression, recent CVA (had presented to [facility] w/ diplopia, slurred speech, weakness, confusion - intubated for low GCS, MR showing bithalamic stroke, was dc'd to NH on 9/21), p/w hypotension. Note from 10/8 1413 indicates that pt had magdaleno placed for urinary retention, was noted to have confusion and chills and low blood pressure, fast HR and pulse 135, was given 200ml NS and 2g ceftriaxone. Also placed on NC for ?resp distress. EMS called later in the evening for BP 79/58. EMS reports that pt was given 1L NS, remained hypotensive, was started on levophed.   Son Kamran at bedside providing additional info. Pt w/ several days of magdaleno trouble/hematuria. Had mild decreased appetite/confusion yesterday, worsening since this afternoon. Before 2d ago pt was able to ambulate, have basic conversations. Parish LEOS to OR Parish LEOS to ran rajan

## 2023-02-01 ENCOUNTER — EMERGENCY (EMERGENCY)
Facility: HOSPITAL | Age: 83
LOS: 1 days | Discharge: LEFT BEFORE DISCHARGE | End: 2023-02-01
Attending: EMERGENCY MEDICINE | Admitting: EMERGENCY MEDICINE
Payer: MEDICARE

## 2023-02-01 VITALS
OXYGEN SATURATION: 96 % | HEART RATE: 56 BPM | DIASTOLIC BLOOD PRESSURE: 81 MMHG | SYSTOLIC BLOOD PRESSURE: 131 MMHG | RESPIRATION RATE: 16 BRPM | WEIGHT: 184.97 LBS | TEMPERATURE: 98 F | HEIGHT: 74 IN

## 2023-02-01 DIAGNOSIS — E78.00 PURE HYPERCHOLESTEROLEMIA, UNSPECIFIED: ICD-10-CM

## 2023-02-01 DIAGNOSIS — Z79.82 LONG TERM (CURRENT) USE OF ASPIRIN: ICD-10-CM

## 2023-02-01 DIAGNOSIS — R40.4 TRANSIENT ALTERATION OF AWARENESS: ICD-10-CM

## 2023-02-01 DIAGNOSIS — F32.A DEPRESSION, UNSPECIFIED: ICD-10-CM

## 2023-02-01 DIAGNOSIS — R41.82 ALTERED MENTAL STATUS, UNSPECIFIED: ICD-10-CM

## 2023-02-01 DIAGNOSIS — Z86.73 PERSONAL HISTORY OF TRANSIENT ISCHEMIC ATTACK (TIA), AND CEREBRAL INFARCTION WITHOUT RESIDUAL DEFICITS: ICD-10-CM

## 2023-02-01 DIAGNOSIS — N40.0 BENIGN PROSTATIC HYPERPLASIA WITHOUT LOWER URINARY TRACT SYMPTOMS: ICD-10-CM

## 2023-02-01 DIAGNOSIS — Z20.822 CONTACT WITH AND (SUSPECTED) EXPOSURE TO COVID-19: ICD-10-CM

## 2023-02-01 DIAGNOSIS — Z53.29 PROCEDURE AND TREATMENT NOT CARRIED OUT BECAUSE OF PATIENT'S DECISION FOR OTHER REASONS: ICD-10-CM

## 2023-02-01 DIAGNOSIS — I10 ESSENTIAL (PRIMARY) HYPERTENSION: ICD-10-CM

## 2023-02-01 PROCEDURE — 99285 EMERGENCY DEPT VISIT HI MDM: CPT

## 2023-02-01 NOTE — ED ADULT TRIAGE NOTE - CHIEF COMPLAINT QUOTE
sent from NH for gen  weakness, episodes of lethargy and unresponsiveness  that start at 8pm ;   AAOx3 at time of triage; denies numbness, dizziness, headache ,  with clear speech

## 2023-02-02 VITALS
SYSTOLIC BLOOD PRESSURE: 144 MMHG | RESPIRATION RATE: 17 BRPM | OXYGEN SATURATION: 97 % | HEART RATE: 55 BPM | DIASTOLIC BLOOD PRESSURE: 88 MMHG | TEMPERATURE: 98 F

## 2023-02-02 LAB
ALBUMIN SERPL ELPH-MCNC: 3.6 G/DL — SIGNIFICANT CHANGE UP (ref 3.3–5)
ALP SERPL-CCNC: 79 U/L — SIGNIFICANT CHANGE UP (ref 40–120)
ALT FLD-CCNC: 5 U/L — LOW (ref 10–45)
ANION GAP SERPL CALC-SCNC: 7 MMOL/L — SIGNIFICANT CHANGE UP (ref 5–17)
APTT BLD: 33 SEC — SIGNIFICANT CHANGE UP (ref 27.5–35.5)
AST SERPL-CCNC: 12 U/L — SIGNIFICANT CHANGE UP (ref 10–40)
BASOPHILS # BLD AUTO: 0.03 K/UL — SIGNIFICANT CHANGE UP (ref 0–0.2)
BASOPHILS NFR BLD AUTO: 0.4 % — SIGNIFICANT CHANGE UP (ref 0–2)
BILIRUB SERPL-MCNC: 0.4 MG/DL — SIGNIFICANT CHANGE UP (ref 0.2–1.2)
BUN SERPL-MCNC: 26 MG/DL — HIGH (ref 7–23)
CALCIUM SERPL-MCNC: 8.8 MG/DL — SIGNIFICANT CHANGE UP (ref 8.4–10.5)
CHLORIDE SERPL-SCNC: 104 MMOL/L — SIGNIFICANT CHANGE UP (ref 96–108)
CO2 SERPL-SCNC: 31 MMOL/L — SIGNIFICANT CHANGE UP (ref 22–31)
CREAT SERPL-MCNC: 1.02 MG/DL — SIGNIFICANT CHANGE UP (ref 0.5–1.3)
EGFR: 74 ML/MIN/1.73M2 — SIGNIFICANT CHANGE UP
EOSINOPHIL # BLD AUTO: 0.04 K/UL — SIGNIFICANT CHANGE UP (ref 0–0.5)
EOSINOPHIL NFR BLD AUTO: 0.6 % — SIGNIFICANT CHANGE UP (ref 0–6)
GLUCOSE SERPL-MCNC: 104 MG/DL — HIGH (ref 70–99)
HCT VFR BLD CALC: 37 % — LOW (ref 39–50)
HGB BLD-MCNC: 12 G/DL — LOW (ref 13–17)
IMM GRANULOCYTES NFR BLD AUTO: 0.3 % — SIGNIFICANT CHANGE UP (ref 0–0.9)
INR BLD: 1.06 — SIGNIFICANT CHANGE UP (ref 0.88–1.16)
LIDOCAIN IGE QN: 18 U/L — SIGNIFICANT CHANGE UP (ref 7–60)
LYMPHOCYTES # BLD AUTO: 1.77 K/UL — SIGNIFICANT CHANGE UP (ref 1–3.3)
LYMPHOCYTES # BLD AUTO: 26.2 % — SIGNIFICANT CHANGE UP (ref 13–44)
MAGNESIUM SERPL-MCNC: 1.9 MG/DL — SIGNIFICANT CHANGE UP (ref 1.6–2.6)
MCHC RBC-ENTMCNC: 29.7 PG — SIGNIFICANT CHANGE UP (ref 27–34)
MCHC RBC-ENTMCNC: 32.4 GM/DL — SIGNIFICANT CHANGE UP (ref 32–36)
MCV RBC AUTO: 91.6 FL — SIGNIFICANT CHANGE UP (ref 80–100)
MONOCYTES # BLD AUTO: 0.83 K/UL — SIGNIFICANT CHANGE UP (ref 0–0.9)
MONOCYTES NFR BLD AUTO: 12.3 % — SIGNIFICANT CHANGE UP (ref 2–14)
NEUTROPHILS # BLD AUTO: 4.07 K/UL — SIGNIFICANT CHANGE UP (ref 1.8–7.4)
NEUTROPHILS NFR BLD AUTO: 60.2 % — SIGNIFICANT CHANGE UP (ref 43–77)
NRBC # BLD: 0 /100 WBCS — SIGNIFICANT CHANGE UP (ref 0–0)
PLATELET # BLD AUTO: 133 K/UL — LOW (ref 150–400)
POTASSIUM SERPL-MCNC: 4.3 MMOL/L — SIGNIFICANT CHANGE UP (ref 3.5–5.3)
POTASSIUM SERPL-SCNC: 4.3 MMOL/L — SIGNIFICANT CHANGE UP (ref 3.5–5.3)
PROT SERPL-MCNC: 6.6 G/DL — SIGNIFICANT CHANGE UP (ref 6–8.3)
PROTHROM AB SERPL-ACNC: 12.6 SEC — SIGNIFICANT CHANGE UP (ref 10.5–13.4)
RBC # BLD: 4.04 M/UL — LOW (ref 4.2–5.8)
RBC # FLD: 15.9 % — HIGH (ref 10.3–14.5)
SARS-COV-2 RNA SPEC QL NAA+PROBE: NEGATIVE — SIGNIFICANT CHANGE UP
SODIUM SERPL-SCNC: 142 MMOL/L — SIGNIFICANT CHANGE UP (ref 135–145)
T4 FREE SERPL-MCNC: 0.9 NG/DL — LOW (ref 0.93–1.7)
TROPONIN T SERPL-MCNC: 0.01 NG/ML — SIGNIFICANT CHANGE UP (ref 0–0.01)
TSH SERPL-MCNC: 4.79 UIU/ML — HIGH (ref 0.27–4.2)
WBC # BLD: 6.76 K/UL — SIGNIFICANT CHANGE UP (ref 3.8–10.5)
WBC # FLD AUTO: 6.76 K/UL — SIGNIFICANT CHANGE UP (ref 3.8–10.5)

## 2023-02-02 PROCEDURE — 85610 PROTHROMBIN TIME: CPT

## 2023-02-02 PROCEDURE — 84439 ASSAY OF FREE THYROXINE: CPT

## 2023-02-02 PROCEDURE — 83690 ASSAY OF LIPASE: CPT

## 2023-02-02 PROCEDURE — 71045 X-RAY EXAM CHEST 1 VIEW: CPT

## 2023-02-02 PROCEDURE — 85025 COMPLETE CBC W/AUTO DIFF WBC: CPT

## 2023-02-02 PROCEDURE — 70450 CT HEAD/BRAIN W/O DYE: CPT | Mod: 26,MA

## 2023-02-02 PROCEDURE — 80053 COMPREHEN METABOLIC PANEL: CPT

## 2023-02-02 PROCEDURE — 87635 SARS-COV-2 COVID-19 AMP PRB: CPT

## 2023-02-02 PROCEDURE — 36415 COLL VENOUS BLD VENIPUNCTURE: CPT

## 2023-02-02 PROCEDURE — 93005 ELECTROCARDIOGRAM TRACING: CPT

## 2023-02-02 PROCEDURE — 82962 GLUCOSE BLOOD TEST: CPT

## 2023-02-02 PROCEDURE — 99285 EMERGENCY DEPT VISIT HI MDM: CPT | Mod: 25

## 2023-02-02 PROCEDURE — 71045 X-RAY EXAM CHEST 1 VIEW: CPT | Mod: 26

## 2023-02-02 PROCEDURE — 70450 CT HEAD/BRAIN W/O DYE: CPT | Mod: MA

## 2023-02-02 PROCEDURE — 84443 ASSAY THYROID STIM HORMONE: CPT

## 2023-02-02 PROCEDURE — 85730 THROMBOPLASTIN TIME PARTIAL: CPT

## 2023-02-02 PROCEDURE — 82550 ASSAY OF CK (CPK): CPT

## 2023-02-02 PROCEDURE — 84484 ASSAY OF TROPONIN QUANT: CPT

## 2023-02-02 PROCEDURE — 83735 ASSAY OF MAGNESIUM: CPT

## 2023-02-02 NOTE — ED PROVIDER NOTE - PATIENT PORTAL LINK FT
You can access the FollowMyHealth Patient Portal offered by [facility] by registering at the following website: [email or web address]. By joining [employer]’s FollowMyHealth portal, you will also be able to view your health information using other applications (apps) compatible with our system.

## 2023-02-02 NOTE — ED ADULT NURSE NOTE - NSIMPLEMENTINTERV_GEN_ALL_ED
Implemented All Fall with Harm Risk Interventions:  [place] to call system. Call bell, personal items and telephone within reach. Instruct patient to call for assistance. Room bathroom lighting operational. Non-slip footwear when patient is off stretcher. Physically safe environment: no spills, clutter or unnecessary equipment. Stretcher in lowest position, wheels locked, appropriate side rails in place. Provide visual cue, wrist band, yellow gown, etc. Monitor gait and stability. Monitor for mental status changes and reorient to person, place, and time. Review medications for side effects contributing to fall risk. Reinforce activity limits and safety measures with patient and family. Provide visual clues: red socks.

## 2023-02-02 NOTE — ED PROVIDER NOTE - CLINICAL SUMMARY MEDICAL DECISION MAKING FREE TEXT BOX
episode of unresponsiveness, now back to baseline, no new focal neuro deficits, afebrile, no hypotension. ddx infection vs. stroke vs seizure  -check labs  -ekg  -cxr  -CT head

## 2023-02-02 NOTE — ED ADULT NURSE NOTE - OBJECTIVE STATEMENT
82M PMH CVA (aphasia) on eliquis BIBA for "unresponsiveness" per family. pt family poor historian for patient unsure how long he was unresponsive. pt baseline upon time of arrival to ED. Dr. Triana bedside for immediate evaluation as upgrade. pt placed on continuous cardiac monitor. denies CP/SOB, n/v/d, fevers/chills.

## 2023-02-02 NOTE — ED PROVIDER NOTE - PROGRESS NOTE DETAILS
son and pt would like to be discharge back to rehab. son states pt is at his baseline. they do not wish to wait for all the labs or check UA. prelim ct negative. demonstrate clear understanding that it is possible to have missed infection. recommend f/u with neuro  Pt informed of all lab and radiology results, demonstrates understanding of results.  Pt wants to be discharged despite being advised STRONGLY and REPEATEDLY to stay for completion of evaluation. A&Ox3, speaking clearly and coherently, demonstrates decision-making capacity.  Pt understands that the risks of leaving include, but not limited to, death and/or disability; pt accepts these risks.  Pt understands to be seen by PMD in 1-2 days; pt understands to return as soon as possible for persistence or any worsening of symptoms. Pt given opportunity to ask any and all questions.  Pt understands that the ER diagnosis being given today is a preliminary one, and, like many ER diagnoses, is a preliminary impression, often based on limited information, that may change as current Sx's evolve or new Sx's develop.

## 2023-02-02 NOTE — ED PROVIDER NOTE - OBJECTIVE STATEMENT
82M hx CVA (aphasia), depression, bph, htn, high chol, BIBEMS for episode of unresponsiveness. per family member pt unresponsive for 3-4 hours at the rehab prior to arrival. no shaking noted. states they couldn't wake him up. however upon EMS arrival pt now back to baseline. pt states he feels ok. no vomiting, no chest pain, no SOB, no fevers. no HA. no dizziness. pt on eliquis. family member states has had few episodes of unresponsiveness this past week.

## 2023-02-02 NOTE — ED PROVIDER NOTE - NSICDXPASTMEDICALHX_GEN_ALL_CORE_FT
PAST MEDICAL HISTORY:  Cerebrovascular accident (CVA)     H/O: depression     High cholesterol     HTN (hypertension)

## 2023-02-02 NOTE — ED PROVIDER NOTE - NSFOLLOWUPINSTRUCTIONS_ED_ALL_ED_FT
Altered Mental Status    WHAT YOU NEED TO KNOW:    What is altered mental status? Altered mental status (AMS) is a disruption in how your brain works that causes a change in behavior. This change can happen suddenly or over days. AMS ranges from slight confusion to total disorientation and increased sleepiness to coma.     What causes AMS? AMS can be caused by physical, psychological, and environmental factors. In older adults, AMS may be caused by a combination of these factors. The following are some of the most common causes of AMS:  •Diseases or conditions in the body that can affect your brain and nervous system: ?Hypoxia (low oxygen levels)     ?Low or high blood sugar levels, or diabetic ketoacidosis    ?Heart attack     ?Dehydration, low or high blood sodium levels     ?Thyroid or adrenal gland disease     ?Urinary tract infection or renal failure     •Diseases or conditions within your skull: ?Seizures     ?Head traumas, concussions     ?Brain tumors or strokes     ?Brain disease, such as encephalopathy    ?High altitude cerebral edema (brain tissue swelling at high altitude)     •Other factors: ?Burns    ?Hypothermia (low body temperature), hyperthermia (high body temperature), or heat stroke     ?Toxic plants, such as mushrooms    ?Carbon monoxide     ?Drug or alcohol withdrawal     ?Psychiatric problems     What factors increase the risk for AMS?   •Older adults may have AMS after changes in their medicines, heart attacks, or hip fractures. Infections, such as urinary tract infections or pneumonia, can also increase the risk for AMS.    •A medical history of high blood pressure, heart problems, diabetes, or psychiatric illness increases the risk for AMS.     What are the signs and symptoms of AMS? You, or those close to you, will notice changes in how you think and in your awareness, movements, and routines. The following are some of the more common signs:   •Lack of concentration or forgetfulness     •Slow responses     •Hallucinations and changes in sleep patterns     •Decreased or increased movement     •Agitation or rambling speech     •Cannot or will not follow reasonable requests     •Cannot be aroused from sleep     How is AMS diagnosed? Your healthcare provider will do a physical exam and ask you and your family about your usual mental status. The provider will want know if the changes happened suddenly or over time. Tell the provider about recent events, such as falls or other traumas or illnesses. Witnesses may be asked about your behavior. Your provider will ask about the medicines you take and any problems with substance abuse. You may also need the following tests to learn the cause of your AMS:  •A neurological exam tells healthcare providers if you are having problems with your brain or nerves. During the exam, your reflexes will be tested.     •Vital signs give healthcare providers information about your current health. Healthcare providers will check your blood pressure, heart rate, breathing rate, and temperature. They will also ask about your pain. They will measure the amount of oxygen in your blood with a device called a pulse oximeter.     •Blood tests are done to check the function of your liver, kidneys, and lungs. They will also show if you have an infection or other toxins in your body. Your blood glucose level will also be checked.    •A chest x-ray  is a picture of your lungs and heart. Healthcare providers use the x-ray to look for signs of infection, such as pneumonia, that could be causing your AMS.    •A CT scan is also called a CAT scan. An x-ray machine uses a computer to take pictures of your head. The pictures may show a tumor, swelling, or bleeding on the brain.     •A lumbar puncture is also called a spinal tap. During a lumbar puncture, you will need to lie still. Healthcare providers may give you medicine to numb a small area of your back. A needle will be put in and fluid will be removed from around your spinal cord. The fluid will be sent to a lab for tests. The tests check for infection, bleeding around your brain and spinal cord, or other problems.     How is AMS treated? Treatment will depend on the cause of your AMS. Treatments with oxygen and medicines may be needed to improve your symptoms. You may be placed in the hospital if your symptoms are severe.     When should I or someone close to me contact my healthcare provider?   •You have sudden changes in behavior.     •You are more sleepy or confused than usual.     •You have questions or concerns about your condition or care.     When should I or someone close to me seek immediate care?   •Your speech is slurred or you are rambling.     •You have a seizure.     •You are not able to move any part of your body freely.     •You cannot be woken up.     CARE AGREEMENT:    You have the right to help plan your care. Learn about your health condition and how it may be treated. Discuss treatment options with your healthcare providers to decide what care you want to receive. You always have the right to refuse treatment.

## 2023-07-06 NOTE — PROGRESS NOTE ADULT - PROBLEM SELECTOR PROBLEM 2
Urinary tract infection
Xray Chest 1 View- PORTABLE-Urgent

## 2023-08-15 ENCOUNTER — EMERGENCY (EMERGENCY)
Facility: HOSPITAL | Age: 83
LOS: 1 days | Discharge: HOME | End: 2023-08-15
Attending: EMERGENCY MEDICINE | Admitting: EMERGENCY MEDICINE
Payer: MEDICARE

## 2023-08-15 VITALS
HEART RATE: 66 BPM | SYSTOLIC BLOOD PRESSURE: 161 MMHG | DIASTOLIC BLOOD PRESSURE: 76 MMHG | RESPIRATION RATE: 16 BRPM | OXYGEN SATURATION: 98 % | TEMPERATURE: 98 F

## 2023-08-15 VITALS
HEART RATE: 68 BPM | RESPIRATION RATE: 18 BRPM | OXYGEN SATURATION: 95 % | SYSTOLIC BLOOD PRESSURE: 143 MMHG | DIASTOLIC BLOOD PRESSURE: 83 MMHG | TEMPERATURE: 98 F

## 2023-08-15 DIAGNOSIS — Z79.01 LONG TERM (CURRENT) USE OF ANTICOAGULANTS: ICD-10-CM

## 2023-08-15 DIAGNOSIS — R45.1 RESTLESSNESS AND AGITATION: ICD-10-CM

## 2023-08-15 DIAGNOSIS — N40.0 BENIGN PROSTATIC HYPERPLASIA WITHOUT LOWER URINARY TRACT SYMPTOMS: ICD-10-CM

## 2023-08-15 DIAGNOSIS — N39.0 URINARY TRACT INFECTION, SITE NOT SPECIFIED: ICD-10-CM

## 2023-08-15 DIAGNOSIS — F32.A DEPRESSION, UNSPECIFIED: ICD-10-CM

## 2023-08-15 DIAGNOSIS — Z86.73 PERSONAL HISTORY OF TRANSIENT ISCHEMIC ATTACK (TIA), AND CEREBRAL INFARCTION WITHOUT RESIDUAL DEFICITS: ICD-10-CM

## 2023-08-15 DIAGNOSIS — R20.0 ANESTHESIA OF SKIN: ICD-10-CM

## 2023-08-15 DIAGNOSIS — I10 ESSENTIAL (PRIMARY) HYPERTENSION: ICD-10-CM

## 2023-08-15 DIAGNOSIS — Z87.440 PERSONAL HISTORY OF URINARY (TRACT) INFECTIONS: ICD-10-CM

## 2023-08-15 DIAGNOSIS — Z79.82 LONG TERM (CURRENT) USE OF ASPIRIN: ICD-10-CM

## 2023-08-15 DIAGNOSIS — Z87.19 PERSONAL HISTORY OF OTHER DISEASES OF THE DIGESTIVE SYSTEM: ICD-10-CM

## 2023-08-15 DIAGNOSIS — E78.00 PURE HYPERCHOLESTEROLEMIA, UNSPECIFIED: ICD-10-CM

## 2023-08-15 LAB
ALBUMIN SERPL ELPH-MCNC: 2.8 G/DL — LOW (ref 3.3–5)
ALBUMIN SERPL ELPH-MCNC: 3.8 G/DL — SIGNIFICANT CHANGE UP (ref 3.3–5)
ALBUMIN SERPL ELPH-MCNC: 3.9 G/DL — SIGNIFICANT CHANGE UP (ref 3.3–5)
ALP SERPL-CCNC: 81 U/L — SIGNIFICANT CHANGE UP (ref 40–120)
ALP SERPL-CCNC: SIGNIFICANT CHANGE UP (ref 40–120)
ALP SERPL-CCNC: SIGNIFICANT CHANGE UP (ref 40–120)
ALT FLD-CCNC: 10 U/L — SIGNIFICANT CHANGE UP (ref 10–45)
ALT FLD-CCNC: SIGNIFICANT CHANGE UP (ref 10–45)
ALT FLD-CCNC: SIGNIFICANT CHANGE UP (ref 10–45)
ANION GAP SERPL CALC-SCNC: 11 MMOL/L — SIGNIFICANT CHANGE UP (ref 5–17)
ANION GAP SERPL CALC-SCNC: 12 MMOL/L — SIGNIFICANT CHANGE UP (ref 5–17)
ANION GAP SERPL CALC-SCNC: 8 MMOL/L — SIGNIFICANT CHANGE UP (ref 5–17)
APPEARANCE UR: CLEAR — SIGNIFICANT CHANGE UP
APTT BLD: 38 SEC — HIGH (ref 24.5–35.6)
AST SERPL-CCNC: 14 U/L — SIGNIFICANT CHANGE UP (ref 10–40)
AST SERPL-CCNC: SIGNIFICANT CHANGE UP (ref 10–40)
AST SERPL-CCNC: SIGNIFICANT CHANGE UP (ref 10–40)
BACTERIA # UR AUTO: PRESENT /HPF
BASE EXCESS BLDV CALC-SCNC: -9.2 MMOL/L — LOW (ref -2–3)
BASE EXCESS BLDV CALC-SCNC: 2.4 MMOL/L — SIGNIFICANT CHANGE UP (ref -2–3)
BASOPHILS # BLD AUTO: 0.04 K/UL — SIGNIFICANT CHANGE UP (ref 0–0.2)
BASOPHILS NFR BLD AUTO: 0.5 % — SIGNIFICANT CHANGE UP (ref 0–2)
BILIRUB DIRECT SERPL-MCNC: 0.2 MG/DL — SIGNIFICANT CHANGE UP (ref 0–0.3)
BILIRUB DIRECT SERPL-MCNC: 0.2 MG/DL — SIGNIFICANT CHANGE UP (ref 0–0.3)
BILIRUB INDIRECT FLD-MCNC: 0.2 MG/DL — SIGNIFICANT CHANGE UP (ref 0.2–1)
BILIRUB INDIRECT FLD-MCNC: 0.4 MG/DL — SIGNIFICANT CHANGE UP (ref 0.2–1)
BILIRUB SERPL-MCNC: 0.4 MG/DL — SIGNIFICANT CHANGE UP (ref 0.2–1.2)
BILIRUB SERPL-MCNC: 0.6 MG/DL — SIGNIFICANT CHANGE UP (ref 0.2–1.2)
BILIRUB SERPL-MCNC: 1.2 MG/DL — SIGNIFICANT CHANGE UP (ref 0.2–1.2)
BILIRUB UR-MCNC: NEGATIVE — SIGNIFICANT CHANGE UP
BUN SERPL-MCNC: 12 MG/DL — SIGNIFICANT CHANGE UP (ref 7–23)
BUN SERPL-MCNC: 15 MG/DL — SIGNIFICANT CHANGE UP (ref 7–23)
BUN SERPL-MCNC: 16 MG/DL — SIGNIFICANT CHANGE UP (ref 7–23)
CA-I SERPL-SCNC: 0.61 MMOL/L — CRITICAL LOW (ref 1.15–1.33)
CA-I SERPL-SCNC: 1.16 MMOL/L — SIGNIFICANT CHANGE UP (ref 1.15–1.33)
CALCIUM SERPL-MCNC: 6.8 MG/DL — LOW (ref 8.4–10.5)
CALCIUM SERPL-MCNC: 9 MG/DL — SIGNIFICANT CHANGE UP (ref 8.4–10.5)
CALCIUM SERPL-MCNC: 9.7 MG/DL — SIGNIFICANT CHANGE UP (ref 8.4–10.5)
CHLORIDE SERPL-SCNC: 100 MMOL/L — SIGNIFICANT CHANGE UP (ref 96–108)
CHLORIDE SERPL-SCNC: 104 MMOL/L — SIGNIFICANT CHANGE UP (ref 96–108)
CHLORIDE SERPL-SCNC: 98 MMOL/L — SIGNIFICANT CHANGE UP (ref 96–108)
CHOLEST SERPL-MCNC: 137 MG/DL — SIGNIFICANT CHANGE UP
CO2 BLDV-SCNC: 16.2 MMOL/L — LOW (ref 22–26)
CO2 BLDV-SCNC: 28.5 MMOL/L — HIGH (ref 22–26)
CO2 SERPL-SCNC: 21 MMOL/L — LOW (ref 22–31)
CO2 SERPL-SCNC: 24 MMOL/L — SIGNIFICANT CHANGE UP (ref 22–31)
CO2 SERPL-SCNC: 26 MMOL/L — SIGNIFICANT CHANGE UP (ref 22–31)
COLOR SPEC: YELLOW — SIGNIFICANT CHANGE UP
CREAT SERPL-MCNC: 0.61 MG/DL — SIGNIFICANT CHANGE UP (ref 0.5–1.3)
CREAT SERPL-MCNC: 0.83 MG/DL — SIGNIFICANT CHANGE UP (ref 0.5–1.3)
CREAT SERPL-MCNC: 0.89 MG/DL — SIGNIFICANT CHANGE UP (ref 0.5–1.3)
DIFF PNL FLD: NEGATIVE — SIGNIFICANT CHANGE UP
EGFR: 87 ML/MIN/1.73M2 — SIGNIFICANT CHANGE UP
EGFR: 88 ML/MIN/1.73M2 — SIGNIFICANT CHANGE UP
EGFR: 97 ML/MIN/1.73M2 — SIGNIFICANT CHANGE UP
EOSINOPHIL # BLD AUTO: 0.03 K/UL — SIGNIFICANT CHANGE UP (ref 0–0.5)
EOSINOPHIL NFR BLD AUTO: 0.4 % — SIGNIFICANT CHANGE UP (ref 0–6)
EPI CELLS # UR: SIGNIFICANT CHANGE UP /HPF (ref 0–5)
GAS PNL BLDV: 124 MMOL/L — LOW (ref 136–145)
GAS PNL BLDV: 134 MMOL/L — LOW (ref 136–145)
GAS PNL BLDV: SIGNIFICANT CHANGE UP
GLUCOSE SERPL-MCNC: 103 MG/DL — HIGH (ref 70–99)
GLUCOSE SERPL-MCNC: 121 MG/DL — HIGH (ref 70–99)
GLUCOSE SERPL-MCNC: 442 MG/DL — HIGH (ref 70–99)
GLUCOSE UR QL: NEGATIVE — SIGNIFICANT CHANGE UP
HCO3 BLDV-SCNC: 15 MMOL/L — LOW (ref 22–29)
HCO3 BLDV-SCNC: 27 MMOL/L — SIGNIFICANT CHANGE UP (ref 22–29)
HCT VFR BLD CALC: 41.3 % — SIGNIFICANT CHANGE UP (ref 39–50)
HDLC SERPL-MCNC: 62 MG/DL — SIGNIFICANT CHANGE UP
HGB BLD-MCNC: 13.8 G/DL — SIGNIFICANT CHANGE UP (ref 13–17)
IMM GRANULOCYTES NFR BLD AUTO: 0.4 % — SIGNIFICANT CHANGE UP (ref 0–0.9)
INR BLD: 1.02 — SIGNIFICANT CHANGE UP (ref 0.85–1.18)
KETONES UR-MCNC: ABNORMAL MG/DL
LEUKOCYTE ESTERASE UR-ACNC: ABNORMAL
LIPID PNL WITH DIRECT LDL SERPL: 64 MG/DL — SIGNIFICANT CHANGE UP
LYMPHOCYTES # BLD AUTO: 1.74 K/UL — SIGNIFICANT CHANGE UP (ref 1–3.3)
LYMPHOCYTES # BLD AUTO: 22.6 % — SIGNIFICANT CHANGE UP (ref 13–44)
MAGNESIUM SERPL-MCNC: 2 MG/DL — SIGNIFICANT CHANGE UP (ref 1.6–2.6)
MCHC RBC-ENTMCNC: 31 PG — SIGNIFICANT CHANGE UP (ref 27–34)
MCHC RBC-ENTMCNC: 33.4 GM/DL — SIGNIFICANT CHANGE UP (ref 32–36)
MCV RBC AUTO: 92.8 FL — SIGNIFICANT CHANGE UP (ref 80–100)
MONOCYTES # BLD AUTO: 0.77 K/UL — SIGNIFICANT CHANGE UP (ref 0–0.9)
MONOCYTES NFR BLD AUTO: 10 % — SIGNIFICANT CHANGE UP (ref 2–14)
NEUTROPHILS # BLD AUTO: 5.1 K/UL — SIGNIFICANT CHANGE UP (ref 1.8–7.4)
NEUTROPHILS NFR BLD AUTO: 66.1 % — SIGNIFICANT CHANGE UP (ref 43–77)
NITRITE UR-MCNC: POSITIVE
NON HDL CHOLESTEROL: 75 MG/DL — SIGNIFICANT CHANGE UP
NRBC # BLD: 0 /100 WBCS — SIGNIFICANT CHANGE UP (ref 0–0)
PCO2 BLDV: 29 MMHG — LOW (ref 42–55)
PCO2 BLDV: 42 MMHG — SIGNIFICANT CHANGE UP (ref 42–55)
PH BLDV: 7.33 — SIGNIFICANT CHANGE UP (ref 7.32–7.43)
PH BLDV: 7.42 — SIGNIFICANT CHANGE UP (ref 7.32–7.43)
PH UR: 7.5 — SIGNIFICANT CHANGE UP (ref 5–8)
PHOSPHATE SERPL-MCNC: 2.7 MG/DL — SIGNIFICANT CHANGE UP (ref 2.5–4.5)
PLATELET # BLD AUTO: 275 K/UL — SIGNIFICANT CHANGE UP (ref 150–400)
PO2 BLDV: 102 MMHG — HIGH (ref 25–45)
PO2 BLDV: 49 MMHG — HIGH (ref 25–45)
POTASSIUM BLDV-SCNC: 1.9 MMOL/L — CRITICAL LOW (ref 3.5–5.1)
POTASSIUM BLDV-SCNC: 3.9 MMOL/L — SIGNIFICANT CHANGE UP (ref 3.5–5.1)
POTASSIUM SERPL-MCNC: 2.8 MMOL/L — CRITICAL LOW (ref 3.5–5.3)
POTASSIUM SERPL-MCNC: 3.8 MMOL/L — SIGNIFICANT CHANGE UP (ref 3.5–5.3)
POTASSIUM SERPL-MCNC: SIGNIFICANT CHANGE UP (ref 3.5–5.3)
POTASSIUM SERPL-MCNC: SIGNIFICANT CHANGE UP (ref 3.5–5.3)
POTASSIUM SERPL-SCNC: 2.8 MMOL/L — CRITICAL LOW (ref 3.5–5.3)
POTASSIUM SERPL-SCNC: 3.8 MMOL/L — SIGNIFICANT CHANGE UP (ref 3.5–5.3)
POTASSIUM SERPL-SCNC: SIGNIFICANT CHANGE UP (ref 3.5–5.3)
POTASSIUM SERPL-SCNC: SIGNIFICANT CHANGE UP (ref 3.5–5.3)
PROT SERPL-MCNC: 5 G/DL — LOW (ref 6–8.3)
PROT SERPL-MCNC: 7.2 G/DL — SIGNIFICANT CHANGE UP (ref 6–8.3)
PROT SERPL-MCNC: 7.5 G/DL — SIGNIFICANT CHANGE UP (ref 6–8.3)
PROT UR-MCNC: ABNORMAL MG/DL
PROTHROM AB SERPL-ACNC: 11.6 SEC — SIGNIFICANT CHANGE UP (ref 9.5–13)
RBC # BLD: 4.45 M/UL — SIGNIFICANT CHANGE UP (ref 4.2–5.8)
RBC # FLD: 14.6 % — HIGH (ref 10.3–14.5)
RBC CASTS # UR COMP ASSIST: < 5 /HPF — SIGNIFICANT CHANGE UP
SAO2 % BLDV: 80.6 % — SIGNIFICANT CHANGE UP (ref 67–88)
SAO2 % BLDV: 98 % — HIGH (ref 67–88)
SODIUM SERPL-SCNC: 130 MMOL/L — LOW (ref 135–145)
SODIUM SERPL-SCNC: 136 MMOL/L — SIGNIFICANT CHANGE UP (ref 135–145)
SODIUM SERPL-SCNC: 138 MMOL/L — SIGNIFICANT CHANGE UP (ref 135–145)
SP GR SPEC: 1.01 — SIGNIFICANT CHANGE UP (ref 1–1.03)
TRIGL SERPL-MCNC: 54 MG/DL — SIGNIFICANT CHANGE UP
TROPONIN T, HIGH SENSITIVITY RESULT: 16 NG/L — SIGNIFICANT CHANGE UP (ref 0–51)
UROBILINOGEN FLD QL: 1 E.U./DL — SIGNIFICANT CHANGE UP
WBC # BLD: 7.71 K/UL — SIGNIFICANT CHANGE UP (ref 3.8–10.5)
WBC # FLD AUTO: 7.71 K/UL — SIGNIFICANT CHANGE UP (ref 3.8–10.5)
WBC UR QL: > 10 /HPF

## 2023-08-15 PROCEDURE — 82330 ASSAY OF CALCIUM: CPT

## 2023-08-15 PROCEDURE — 70450 CT HEAD/BRAIN W/O DYE: CPT | Mod: 26,MA,XU

## 2023-08-15 PROCEDURE — 36415 COLL VENOUS BLD VENIPUNCTURE: CPT

## 2023-08-15 PROCEDURE — 81001 URINALYSIS AUTO W/SCOPE: CPT

## 2023-08-15 PROCEDURE — 0042T: CPT | Mod: MA

## 2023-08-15 PROCEDURE — 80076 HEPATIC FUNCTION PANEL: CPT

## 2023-08-15 PROCEDURE — 80061 LIPID PANEL: CPT

## 2023-08-15 PROCEDURE — 82803 BLOOD GASES ANY COMBINATION: CPT

## 2023-08-15 PROCEDURE — 85025 COMPLETE CBC W/AUTO DIFF WBC: CPT

## 2023-08-15 PROCEDURE — 83735 ASSAY OF MAGNESIUM: CPT

## 2023-08-15 PROCEDURE — 84295 ASSAY OF SERUM SODIUM: CPT

## 2023-08-15 PROCEDURE — 99285 EMERGENCY DEPT VISIT HI MDM: CPT

## 2023-08-15 PROCEDURE — 80053 COMPREHEN METABOLIC PANEL: CPT

## 2023-08-15 PROCEDURE — 85730 THROMBOPLASTIN TIME PARTIAL: CPT

## 2023-08-15 PROCEDURE — 84100 ASSAY OF PHOSPHORUS: CPT

## 2023-08-15 PROCEDURE — 84132 ASSAY OF SERUM POTASSIUM: CPT

## 2023-08-15 PROCEDURE — 84484 ASSAY OF TROPONIN QUANT: CPT

## 2023-08-15 PROCEDURE — 85610 PROTHROMBIN TIME: CPT

## 2023-08-15 PROCEDURE — 80048 BASIC METABOLIC PNL TOTAL CA: CPT

## 2023-08-15 PROCEDURE — 70496 CT ANGIOGRAPHY HEAD: CPT | Mod: MA

## 2023-08-15 PROCEDURE — 99285 EMERGENCY DEPT VISIT HI MDM: CPT | Mod: 25

## 2023-08-15 PROCEDURE — 71045 X-RAY EXAM CHEST 1 VIEW: CPT

## 2023-08-15 PROCEDURE — 70498 CT ANGIOGRAPHY NECK: CPT | Mod: MA

## 2023-08-15 PROCEDURE — 70498 CT ANGIOGRAPHY NECK: CPT | Mod: 26,MA

## 2023-08-15 PROCEDURE — 70450 CT HEAD/BRAIN W/O DYE: CPT | Mod: MA

## 2023-08-15 PROCEDURE — 82962 GLUCOSE BLOOD TEST: CPT

## 2023-08-15 PROCEDURE — 70496 CT ANGIOGRAPHY HEAD: CPT | Mod: 26,MA

## 2023-08-15 PROCEDURE — 71045 X-RAY EXAM CHEST 1 VIEW: CPT | Mod: 26

## 2023-08-15 PROCEDURE — 93005 ELECTROCARDIOGRAM TRACING: CPT

## 2023-08-15 PROCEDURE — 96374 THER/PROPH/DIAG INJ IV PUSH: CPT | Mod: XU

## 2023-08-15 RX ORDER — MAGNESIUM SULFATE 500 MG/ML
2 VIAL (ML) INJECTION ONCE
Refills: 0 | Status: DISCONTINUED | OUTPATIENT
Start: 2023-08-15 | End: 2023-08-15

## 2023-08-15 RX ORDER — ATORVASTATIN CALCIUM 80 MG/1
1 TABLET, FILM COATED ORAL
Qty: 0 | Refills: 0 | DISCHARGE

## 2023-08-15 RX ORDER — SODIUM CHLORIDE 9 MG/ML
1000 INJECTION INTRAMUSCULAR; INTRAVENOUS; SUBCUTANEOUS ONCE
Refills: 0 | Status: COMPLETED | OUTPATIENT
Start: 2023-08-15 | End: 2023-08-15

## 2023-08-15 RX ORDER — CEFTRIAXONE 500 MG/1
1000 INJECTION, POWDER, FOR SOLUTION INTRAMUSCULAR; INTRAVENOUS ONCE
Refills: 0 | Status: COMPLETED | OUTPATIENT
Start: 2023-08-15 | End: 2023-08-15

## 2023-08-15 RX ORDER — CEFPODOXIME PROXETIL 100 MG
1 TABLET ORAL
Qty: 14 | Refills: 0
Start: 2023-08-15 | End: 2023-08-21

## 2023-08-15 RX ORDER — ASPIRIN/CALCIUM CARB/MAGNESIUM 324 MG
1 TABLET ORAL
Qty: 0 | Refills: 0 | DISCHARGE

## 2023-08-15 RX ORDER — AMLODIPINE BESYLATE 2.5 MG/1
1 TABLET ORAL
Qty: 0 | Refills: 0 | DISCHARGE

## 2023-08-15 RX ORDER — BACLOFEN 100 %
1 POWDER (GRAM) MISCELLANEOUS
Qty: 0 | Refills: 0 | DISCHARGE

## 2023-08-15 RX ORDER — POTASSIUM CHLORIDE 20 MEQ
40 PACKET (EA) ORAL ONCE
Refills: 0 | Status: DISCONTINUED | OUTPATIENT
Start: 2023-08-15 | End: 2023-08-15

## 2023-08-15 RX ADMIN — SODIUM CHLORIDE 1000 MILLILITER(S): 9 INJECTION INTRAMUSCULAR; INTRAVENOUS; SUBCUTANEOUS at 19:31

## 2023-08-15 RX ADMIN — CEFTRIAXONE 100 MILLIGRAM(S): 500 INJECTION, POWDER, FOR SOLUTION INTRAMUSCULAR; INTRAVENOUS at 20:49

## 2023-08-15 NOTE — ED PROVIDER NOTE - NS ED ROS FT
CONSTITUTIONAL: No fever, no chills, no fatigue  EYES: No eye redness, no visual changes  ENT: No ear pain, no sore throat  CARDIOVASCULAR: No chest pain, no palpitations  RESPIRATORY: No cough, no SOB  GI: No abdominal pain, no nausea, no vomiting, no constipation, no diarrhea  GENITOURINARY: No dysuria, no frequency, no hematuria  MUSCULOSKELETAL: No back pain, no joint pain, no myalgias  SKIN: No rash, no peripheral edema  NEURO: numbness to the left arm, face, and leg, confusion  PSYCH: Agitation     ALL OTHER SYSTEMS NEGATIVE.

## 2023-08-15 NOTE — ED PROVIDER NOTE - CLINICAL SUMMARY MEDICAL DECISION MAKING FREE TEXT BOX
83 y/o male with multiple medical problems presents with increased agitation and complaints of left-sided numbness. Considering CVA and UTI, stroke imaging was ordered and pt was seen by stroke team but the stroke code was canceled due to the timing and pt being out of the intervenous window. Will evaluate pt for metabolic and infectious abnormalities. Dispo pending workup, reevaluation, and consultation.

## 2023-08-15 NOTE — CONSULT NOTE ADULT - SUBJECTIVE AND OBJECTIVE BOX
Neurology Stroke Consult Note    HPI: 81yo M PMHx of thalamic CVA, depression, HTN, BPH, diverticulitis, and HLD presents to ED for L sided numbness x 24 hours per son. Pt resides at an assisted living home and yesterday his son visited him, around 6pm yesterday he stated his arm felt "green". Pt son states he took asked more and thinks his father meant his arm was numb. Pt is A&O x 2 at baseline with some cognitive impairment. Pt noted to be agitated and more altered since friday, nursing facility did a UA and culture (pt has hx of UTI) but was not started on abx. Pt's son wanted him to go to the ED as he is still endorsing change in his L arm and seemed increasingly confused. EMS with pt medication list from nursing facility, pt taking eliquis for unknown reason. Pt sons states it's for a penile hematoma he had. Denies hx of DVT, PE or A fib.     Home medications per EMS chart:  Eliquis  Aricept  amLODIPine 10 mg oral tablet: 1 tab(s) orally once a day  atorvastatin 80 mg oral tablet: 1 tab(s) orally once a day  tamsulosin 0.4 mg oral capsule: 1 cap(s) orally once a day      Allergies  No Known Allergies  Intolerances      Vital Signs Last 24 Hrs  T(C): 36.7 (15 Aug 2023 19:00), Max: 36.9 (15 Aug 2023 17:36)  T(F): 98.1 (15 Aug 2023 19:00), Max: 98.5 (15 Aug 2023 17:36)  HR: 82 (15 Aug 2023 19:00) (68 - 82)  BP: 148/70 (15 Aug 2023 19:00) (143/83 - 148/70)  BP(mean): --  RR: 18 (15 Aug 2023 19:00) (18 - 18)  SpO2: 97% (15 Aug 2023 19:00) (95% - 97%)    Parameters below as of 15 Aug 2023 19:00  Patient On (Oxygen Delivery Method): room air        Physical exam:  General: No acute distress, awake and alert  Eyes: Anicteric sclerae, moist conjunctivae, see below for CNs  Neck: trachea midline  Cardiovascular: Regular rate and rhythm on monitor  Pulmonary: No use of accessory muscles  GI: Abdomen soft, non-distended, non-tender  Extremities: no edema    Neurologic:  -Mental status: Awake, alert, oriented to person, place, but not time. Speech is fluent with intact naming and repetition. Pt is easily side tracked and does at times answers inappropriately to question asked.  No dysarthria noted. Recent and remote memory not intact. Able to follow simple commands.  -Cranial nerves:   II: Visual fields are full to confrontation.  III, IV, VI: Pt unable to move eyes to left and right but not upward and downward (chronic since prior stroke; seen by opthoneurology). Pupils equally round and reactive to light  V:  Facial sensation V1-V3 equal and intact   VII:  R NLFF (chronic)  VIII: Hearing is bilaterally intact to voice  XI: Head turning and shoulder shrug are intact.  XII: Tongue protrudes midline  Motor: Normal bulk and tone. No pronator drift. Strength bilateral upper extremity 5/5, bilateral lower extremities 5/5.  Sensation: Intact to light touch bilaterally. No neglect or extinction on double simultaneous testing.  Coordination: No dysmetria on finger-to-nose bilaterally  Gait: Narrow gait and steady with assistance (uses walker and assistance at baseline)    LABS:                        13.8   7.71  )-----------( 275      ( 15 Aug 2023 18:06 )             41.3     08-15    x   |  x   |  x   ----------------------------<  x   See Note   |  x   |  x     Ca    9.7      15 Aug 2023 18:06    TPro  7.2  /  Alb  3.8  /  TBili  0.6  /  DBili  0.2  /  AST  See Note  /  ALT  See Note  /  AlkPhos  See Note  08-15    PT/INR - ( 15 Aug 2023 18:06 )   PT: 11.6 sec;   INR: 1.02          PTT - ( 15 Aug 2023 18:06 )  PTT:38.0 sec  Urinalysis Basic - ( 15 Aug 2023 18:06 )    Color: x / Appearance: x / SG: x / pH: x  Gluc: 103 mg/dL / Ketone: x  / Bili: x / Urobili: x   Blood: x / Protein: x / Nitrite: x   Leuk Esterase: x / RBC: x / WBC x   Sq Epi: x / Non Sq Epi: x / Bacteria: x        RADIOLOGY & ADDITIONAL TESTS:    CT Brain Stroke Protocol (08.15.23 @ 17:56) >  IMPRESSION:  No acute intracranial hemorrhage, large demarcated territorial infarct,   or masslike lesion. Chronic thalamic infarcts.      CT Angio Brain Stroke Protocol  w/ IV Cont (08.15.23 @ 17:57) >  IMPRESSION:  Negative CT perfusion study of the brain.  No arterial occlusion or high grade stenosis on intracranial CTA.  IMPRESSION: No arterial steno-occlusive disease in the neck.

## 2023-08-15 NOTE — ED ADULT NURSE NOTE - NSFALLHARMRISKINTERV_ED_ALL_ED

## 2023-08-15 NOTE — ED ADULT NURSE NOTE - NSFALLRISKASMT_ED_ALL_ED_DT
Medicare pt has received, reviewed, and signed 2nd IM letter informing them of their right to appeal the discharge. Signed copied has been placed on pt bedside chart. 15-Aug-2023 18:00

## 2023-08-15 NOTE — ED PROVIDER NOTE - PHYSICAL EXAMINATION
VITAL SIGNS: I have reviewed nursing notes and confirm.  CONSTITUTIONAL: Well appearing, in no acute distress.   SKIN:  warm and dry, no acute rash.   HEAD:  normocephalic, atraumatic.  EYES: EOM intact; conjunctiva and sclera clear.  ENT: No nasal discharge; airway clear.   NECK: Supple.  CARD: S1, S2, Regular rate and rhythm.   RESP:  Clear to auscultation b/l, no wheezes, rales or rhonchi.  ABD: Normal bowel sounds; soft; non-distended; non-tender; no guarding/ rebound.  EXT: Normal ROM. No peripheral edema. Pulses intact and equal b/l.  NEURO: CN'II-XII intact, no pronator drift, nl finger to nose, clear speech, gait intact. AOx2 (not to time), slow to answer, poor historian   PSYCH: Cooperative, mood and affect appropriate.

## 2023-08-15 NOTE — ED ADULT NURSE NOTE - OBJECTIVE STATEMENT
Pt BIBEMS from assisted living for numbness on left arm since yesterday at 1730. Pt alert and oriented to person and place. Per family, pt has seemed more confused and agitated the past few days. Pt hx dementia. Stroke code called. PERRL. Hx frequent UTIs. Denies chest pain, shortness of breath, nausea/vomiting, dizziness. PIV obtained. Left sided facial droop from previous stroke.

## 2023-08-15 NOTE — ED PROVIDER NOTE - PATIENT PORTAL LINK FT
You can access the FollowMyHealth Patient Portal offered by [facility] by registering at the following website: [email or web address]. By joining [employer]’s FollowMyHealth portal, you will also be able to view your health information using other applications (apps) compatible with our system.

## 2023-08-15 NOTE — ED ADULT TRIAGE NOTE - CHIEF COMPLAINT QUOTE
Per son, patient c/o left side numbness since yesterday, reports symptoms worsened over the last hour. Son also reports change in mental status, states "He said his arm feels green."

## 2023-08-15 NOTE — CONSULT NOTE ADULT - ASSESSMENT
81yo M PMHx of thalamic CVA, depression, HTN, BPH, diverticulitis, and HLD presents to ED from assisted living for L sided numbness x 24 hours per son. Pt with subjective decreased sensation to L arm per son however on exam and questioned pt does not endorse decreased sensation. Pt son states he said "it feels green" but he thinks he's inferring that it's numb. Pt with 4 days of increased confusion and agitation per son and had UA/culture done at assisted living, no results or abx started.  Pt with no new focal findings. Pt with R NLFF, A&O x 2, limited EOM at baseline per son. CTH: negative for acute intracranial hemorrhage, large demarcated territorial infarct, or masslike lesion. Chronic thalamic infarcts. CTA: negative for arterial occlusion or high grade stenosis intracranially and no arterial steno-occlusive disease in the neck.      Given no new focal findings one exam, no new findings on CTH/CTA, low suspicion for ischemic etiology of subjective change in mental status     No further recommendations from stroke team at this time    - Rest of care per primary team    Discussed with Neurology Fellow Dr. Weir and Attending Dr. Wilson

## 2023-08-15 NOTE — ED PROVIDER NOTE - OBJECTIVE STATEMENT
83 y/o male with a PMHx of thalamic CVA, depression, HTN, and hypercholesterolemia and resides at assisted care presents to the ED with increased agitation for the past few days, much like during previous UTIs, and a new left sided compliant of numbness to the left arm, face, and leg as well as a complaint of "feeling green." Pt's son reports that he has seemed increasingly confused as well.

## 2023-08-16 PROBLEM — I10 ESSENTIAL (PRIMARY) HYPERTENSION: Chronic | Status: ACTIVE | Noted: 2023-02-02

## 2023-08-16 PROBLEM — I63.9 CEREBRAL INFARCTION, UNSPECIFIED: Chronic | Status: ACTIVE | Noted: 2023-02-02

## 2023-08-16 PROBLEM — Z86.59 PERSONAL HISTORY OF OTHER MENTAL AND BEHAVIORAL DISORDERS: Chronic | Status: ACTIVE | Noted: 2023-02-02

## 2023-08-16 PROBLEM — E78.00 PURE HYPERCHOLESTEROLEMIA, UNSPECIFIED: Chronic | Status: ACTIVE | Noted: 2023-02-02

## 2023-10-09 NOTE — PHYSICAL THERAPY INITIAL EVALUATION ADULT - IMPAIRMENTS CONTRIBUTING IMPAIRED BED MOBILITY, REHAB EVAL
Detail Level: Detailed
impaired balance/decreased flexibility/impaired postural control/decreased strength

## 2024-06-27 ENCOUNTER — EMERGENCY (EMERGENCY)
Facility: HOSPITAL | Age: 84
LOS: 1 days | Discharge: HOME | End: 2024-06-27
Attending: EMERGENCY MEDICINE | Admitting: EMERGENCY MEDICINE
Payer: MEDICARE

## 2024-06-27 VITALS
TEMPERATURE: 99 F | DIASTOLIC BLOOD PRESSURE: 97 MMHG | HEART RATE: 80 BPM | RESPIRATION RATE: 18 BRPM | SYSTOLIC BLOOD PRESSURE: 192 MMHG | OXYGEN SATURATION: 97 % | WEIGHT: 169.98 LBS

## 2024-06-27 VITALS
HEART RATE: 80 BPM | SYSTOLIC BLOOD PRESSURE: 180 MMHG | RESPIRATION RATE: 18 BRPM | DIASTOLIC BLOOD PRESSURE: 98 MMHG | OXYGEN SATURATION: 98 %

## 2024-06-27 DIAGNOSIS — F32.A DEPRESSION, UNSPECIFIED: ICD-10-CM
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
## 2024-06-27 DIAGNOSIS — S01.01XA LACERATION WITHOUT FOREIGN BODY OF SCALP, INITIAL ENCOUNTER: ICD-10-CM
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
## 2024-06-27 DIAGNOSIS — R45.6 VIOLENT BEHAVIOR: ICD-10-CM

## 2024-06-27 DIAGNOSIS — S80.211A ABRASION, RIGHT KNEE, INITIAL ENCOUNTER: ICD-10-CM

## 2024-06-27 DIAGNOSIS — R50.9 FEVER, UNSPECIFIED: ICD-10-CM
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
## 2024-06-27 LAB
ADD ON TEST-SPECIMEN IN LAB: SIGNIFICANT CHANGE UP
ANION GAP SERPL CALC-SCNC: 8 MMOL/L — SIGNIFICANT CHANGE UP (ref 5–17)
BASOPHILS # BLD AUTO: 0.04 K/UL — SIGNIFICANT CHANGE UP (ref 0–0.2)
BASOPHILS NFR BLD AUTO: 0.5 % — SIGNIFICANT CHANGE UP (ref 0–2)
BUN SERPL-MCNC: 19 MG/DL — SIGNIFICANT CHANGE UP (ref 7–23)
CALCIUM SERPL-MCNC: 9 MG/DL — SIGNIFICANT CHANGE UP (ref 8.4–10.5)
CHLORIDE SERPL-SCNC: 107 MMOL/L — SIGNIFICANT CHANGE UP (ref 96–108)
CO2 SERPL-SCNC: 25 MMOL/L — SIGNIFICANT CHANGE UP (ref 22–31)
CREAT SERPL-MCNC: 0.96 MG/DL — SIGNIFICANT CHANGE UP (ref 0.5–1.3)
EGFR: 79 ML/MIN/1.73M2 — SIGNIFICANT CHANGE UP
EOSINOPHIL # BLD AUTO: 0.04 K/UL — SIGNIFICANT CHANGE UP (ref 0–0.5)
EOSINOPHIL NFR BLD AUTO: 0.5 % — SIGNIFICANT CHANGE UP (ref 0–6)
GLUCOSE SERPL-MCNC: 106 MG/DL — HIGH (ref 70–99)
HCT VFR BLD CALC: 36.6 % — LOW (ref 39–50)
HGB BLD-MCNC: 11.9 G/DL — LOW (ref 13–17)
IMM GRANULOCYTES NFR BLD AUTO: 0.5 % — SIGNIFICANT CHANGE UP (ref 0–0.9)
LYMPHOCYTES # BLD AUTO: 0.98 K/UL — LOW (ref 1–3.3)
LYMPHOCYTES # BLD AUTO: 11.7 % — LOW (ref 13–44)
MCHC RBC-ENTMCNC: 30.8 PG — SIGNIFICANT CHANGE UP (ref 27–34)
MCHC RBC-ENTMCNC: 32.5 GM/DL — SIGNIFICANT CHANGE UP (ref 32–36)
MCV RBC AUTO: 94.8 FL — SIGNIFICANT CHANGE UP (ref 80–100)
MONOCYTES # BLD AUTO: 0.78 K/UL — SIGNIFICANT CHANGE UP (ref 0–0.9)
MONOCYTES NFR BLD AUTO: 9.3 % — SIGNIFICANT CHANGE UP (ref 2–14)
NEUTROPHILS # BLD AUTO: 6.51 K/UL — SIGNIFICANT CHANGE UP (ref 1.8–7.4)
NEUTROPHILS NFR BLD AUTO: 77.5 % — HIGH (ref 43–77)
NRBC # BLD: 0 /100 WBCS — SIGNIFICANT CHANGE UP (ref 0–0)
PLATELET # BLD AUTO: 231 K/UL — SIGNIFICANT CHANGE UP (ref 150–400)
POTASSIUM SERPL-MCNC: 3.9 MMOL/L — SIGNIFICANT CHANGE UP (ref 3.5–5.3)
POTASSIUM SERPL-SCNC: 3.9 MMOL/L — SIGNIFICANT CHANGE UP (ref 3.5–5.3)
RBC # BLD: 3.86 M/UL — LOW (ref 4.2–5.8)
RBC # FLD: 14.6 % — HIGH (ref 10.3–14.5)
SODIUM SERPL-SCNC: 140 MMOL/L — SIGNIFICANT CHANGE UP (ref 135–145)
WBC # BLD: 8.39 K/UL — SIGNIFICANT CHANGE UP (ref 3.8–10.5)
WBC # FLD AUTO: 8.39 K/UL — SIGNIFICANT CHANGE UP (ref 3.8–10.5)

## 2024-06-27 PROCEDURE — 85025 COMPLETE CBC W/AUTO DIFF WBC: CPT

## 2024-06-27 PROCEDURE — 12002 RPR S/N/AX/GEN/TRNK2.6-7.5CM: CPT

## 2024-06-27 PROCEDURE — 71250 CT THORAX DX C-: CPT | Mod: MC

## 2024-06-27 PROCEDURE — 12001 RPR S/N/AX/GEN/TRNK 2.5CM/<: CPT

## 2024-06-27 PROCEDURE — 70450 CT HEAD/BRAIN W/O DYE: CPT | Mod: 26,MC

## 2024-06-27 PROCEDURE — 71250 CT THORAX DX C-: CPT | Mod: 26,MC

## 2024-06-27 PROCEDURE — 99284 EMERGENCY DEPT VISIT MOD MDM: CPT | Mod: 25

## 2024-06-27 PROCEDURE — 84484 ASSAY OF TROPONIN QUANT: CPT

## 2024-06-27 PROCEDURE — 74176 CT ABD & PELVIS W/O CONTRAST: CPT | Mod: MC

## 2024-06-27 PROCEDURE — 72125 CT NECK SPINE W/O DYE: CPT | Mod: 26,MC

## 2024-06-27 PROCEDURE — 36415 COLL VENOUS BLD VENIPUNCTURE: CPT

## 2024-06-27 PROCEDURE — 70486 CT MAXILLOFACIAL W/O DYE: CPT | Mod: 26,MC

## 2024-06-27 PROCEDURE — 70486 CT MAXILLOFACIAL W/O DYE: CPT | Mod: MC

## 2024-06-27 PROCEDURE — 73562 X-RAY EXAM OF KNEE 3: CPT

## 2024-06-27 PROCEDURE — 80048 BASIC METABOLIC PNL TOTAL CA: CPT

## 2024-06-27 PROCEDURE — 72125 CT NECK SPINE W/O DYE: CPT | Mod: MC

## 2024-06-27 PROCEDURE — 74176 CT ABD & PELVIS W/O CONTRAST: CPT | Mod: 26,MC

## 2024-06-27 PROCEDURE — 73562 X-RAY EXAM OF KNEE 3: CPT | Mod: 26,RT

## 2024-06-27 PROCEDURE — 70450 CT HEAD/BRAIN W/O DYE: CPT | Mod: MC

## 2024-06-27 RX ORDER — LIDOCAINE HYDROCHLORIDE 20 MG/ML
10 INJECTION, SOLUTION EPIDURAL; INFILTRATION; INTRACAUDAL; PERINEURAL ONCE
Refills: 0 | Status: COMPLETED | OUTPATIENT
Start: 2024-06-27 | End: 2024-06-27

## 2024-06-27 RX ADMIN — LIDOCAINE HYDROCHLORIDE 10 MILLILITER(S): 20 INJECTION, SOLUTION EPIDURAL; INFILTRATION; INTRACAUDAL; PERINEURAL at 16:30

## 2025-04-18 NOTE — PATIENT PROFILE ADULT - FUNCTIONAL ASSESSMENT - DAILY ACTIVITY 4.
Physical Therapy Contact Note    Patient Name: Sen Vasquez  Age:  79 y.o., Sex:  male  Medical Record #: [id number]  Today's Date: 4/18/2025    Pt with RASS -5 per chart, intubated and sedated on prop and versed, plan for MMA embo tomorrow. Will hold and follow up as appropriate.    Diallo Cochran PT, DPT    2 = A lot of assistance